# Patient Record
Sex: FEMALE | Race: OTHER | Employment: UNEMPLOYED | URBAN - METROPOLITAN AREA
[De-identification: names, ages, dates, MRNs, and addresses within clinical notes are randomized per-mention and may not be internally consistent; named-entity substitution may affect disease eponyms.]

---

## 2017-01-06 PROBLEM — R21 RASH OF FACE: Status: ACTIVE | Noted: 2017-01-06

## 2017-01-06 PROBLEM — F19.982 INSOMNIA DUE TO DRUG (HCC): Status: ACTIVE | Noted: 2017-01-06

## 2017-01-18 ENCOUNTER — OFFICE VISIT (OUTPATIENT)
Dept: SURGERY | Facility: CLINIC | Age: 37
End: 2017-01-18

## 2017-01-18 VITALS
SYSTOLIC BLOOD PRESSURE: 110 MMHG | DIASTOLIC BLOOD PRESSURE: 70 MMHG | BODY MASS INDEX: 38.45 KG/M2 | RESPIRATION RATE: 14 BRPM | HEIGHT: 67 IN | WEIGHT: 245 LBS | HEART RATE: 102 BPM

## 2017-01-18 DIAGNOSIS — H47.10 OPTIC PAPILLA EDEMA: ICD-10-CM

## 2017-01-18 DIAGNOSIS — H53.9 VISION DISTURBANCE: ICD-10-CM

## 2017-01-18 DIAGNOSIS — C50.912 INVASIVE DUCTAL CARCINOMA OF BREAST, LEFT (HCC): ICD-10-CM

## 2017-01-18 DIAGNOSIS — G93.89 BRAIN MASS: Primary | ICD-10-CM

## 2017-01-18 PROCEDURE — 99204 OFFICE O/P NEW MOD 45 MIN: CPT | Performed by: NURSE PRACTITIONER

## 2017-01-18 NOTE — H&P
Neurosurgery Clinic Visit  2017    Jenna Calzada PCP:  Jordan Nielsen MD    10/27/1980 MRN [id number]       Chief Complaint:  Patient presents with:   Other: NP referred by Dr. Flo Villalta for [facility]       HISTORY OF PRESENT ILLNESS:  [facility] 1:1:1 solution Take 5-10 mL by mouth TID AC&HS. Swish and Swallow or Swish and Spit Disp: 480 mL Rfl: 3   hydrocortisone (PROCTOSOL HC) 2.5 % Rectal Cream Apply to rectal area QID x 7 days.  (Patient taking differently: Apply to rectal area QID x 7 days. ) brisk bilaterally. EOMs intact. Visual fields are full. Face is symmetrical. Tongue is midline. Uvula and palate elevate symmetrically. Shrug shoulders normally bilaterally. Cranial nerves II-XII are grossly intact. Facial sensation intact.  Finger fluid  collections are seen.     Hemorrhage: None. Ventricular system: No hydrocephalus. Basal cisterns: Patent.   Cerebral parenchyma: Punctate chronic appearing lacunar type infarct or perivascular space is  visualized in the LEFT lentiform nucleus, kika PLAN:  1. Brain mass    2. Optic papilla edema    3. Invasive ductal carcinoma of breast, left    4. Vision disturbance      The report report is only available of her CT brain with and without scan we are unable to access the images.   We will need dedicat next week. Since she has visual symptoms and evidence of papilledema on the same side as the reported tumor she should undergo urgent tumor resection pending review of the imaging.   We will follow-up with her oncologist for staging of her cancer and futur

## 2017-01-18 NOTE — PATIENT INSTRUCTIONS
Refill policies:    • Allow 2 business days for refills; controlled substances may take longer.   • Contact your pharmacy at least 5 days prior to running out of medication and have them send an electronic request or submit request through the “request re your physician has recommended that you have a procedure or additional testing performed. [facility] will contact your insurance carrier to obtain pre-certification or prior authorization.     Unfortunately, HEIDI has seen an increas

## 2017-01-18 NOTE — PROGRESS NOTES
Pt was having problems with left eye, opthomologist recommended a CT, mass was found on CT,  Pt denies any pain at the moment.

## 2017-01-19 ENCOUNTER — TELEPHONE (OUTPATIENT)
Dept: SURGERY | Facility: CLINIC | Age: 37
End: 2017-01-19

## 2017-01-19 NOTE — TELEPHONE ENCOUNTER
Patient states she has expander in left breast for reconstructive surgery so MRI could not be done  She says that Dr. Nallely Collins was notified and wants to make sure there is nothing else to be done.   I informed her that I would discuss with APN to see if she kn

## 2017-01-20 PROBLEM — G93.9 BRAIN LESION: Status: ACTIVE | Noted: 2017-01-20

## 2017-01-23 ENCOUNTER — OFFICE VISIT (OUTPATIENT)
Dept: SURGERY | Facility: CLINIC | Age: 37
End: 2017-01-23

## 2017-01-23 ENCOUNTER — TELEPHONE (OUTPATIENT)
Dept: SURGERY | Facility: CLINIC | Age: 37
End: 2017-01-23

## 2017-01-23 VITALS
HEART RATE: 100 BPM | HEIGHT: 67 IN | RESPIRATION RATE: 14 BRPM | BODY MASS INDEX: 38.77 KG/M2 | WEIGHT: 247 LBS | SYSTOLIC BLOOD PRESSURE: 118 MMHG | DIASTOLIC BLOOD PRESSURE: 70 MMHG

## 2017-01-23 DIAGNOSIS — G93.89 BRAIN MASS: Primary | ICD-10-CM

## 2017-01-23 DIAGNOSIS — H53.412 SCOTOMA, LEFT: ICD-10-CM

## 2017-01-23 DIAGNOSIS — C50.912 INVASIVE DUCTAL CARCINOMA OF BREAST, LEFT (HCC): ICD-10-CM

## 2017-01-23 PROCEDURE — 99214 OFFICE O/P EST MOD 30 MIN: CPT | Performed by: NURSE PRACTITIONER

## 2017-01-23 RX ORDER — ERYTHROMYCIN 5 MG/G
1 OINTMENT OPHTHALMIC
Refills: 0 | Status: ON HOLD | COMMUNITY
Start: 2017-01-20 | End: 2017-02-03

## 2017-01-23 NOTE — TELEPHONE ENCOUNTER
Spoke with  who would like to get patient scheduled for surgery for a left occipital craniotomy for tumor removal for next Tuesday 1/31/17 to follow his first case. Will need to call patient to get scheduled and go over surgery instructions.

## 2017-01-23 NOTE — TELEPHONE ENCOUNTER
Patient was unable to complete MRI due to [place] [employer] tissue expander that was placed on 8/18/16 by . Will need to touch base with that office to confirm whether or not tissue expander is MRI compatible.     [facility]

## 2017-01-23 NOTE — PATIENT INSTRUCTIONS
Refill policies:    • Allow 2 business days for refills; controlled substances may take longer.   • Contact your pharmacy at least 5 days prior to running out of medication and have them send an electronic request or submit request through the “request re your physician has recommended that you have a procedure or additional testing performed. [facility] will contact your insurance carrier to obtain pre-certification or prior authorization.     Unfortunately, HEIDI has seen an increas

## 2017-01-23 NOTE — TELEPHONE ENCOUNTER
Informed Dr. Dawit Cardenas office that we have found out that Tissue expander is not MRI compatible.

## 2017-01-23 NOTE — PROGRESS NOTES
Neurosurgery Clinic Visit  2017    Jenna Chapman PCP:  Ashleigh Fall MD    10/27/1980 MRN [id number]       Chief Complaint:  Patient presents with:   Other: follow  up from 2017      HISTORY OF PRESENT ILLNESS:  Kat Curran is a(n) 45 filiberto mg total) by mouth every morning. Disp: 30 capsule Rfl: 6   Benadryl/Maalox/Lidocaine 1:1:1 solution Take 5-10 mL by mouth TID AC&HS.  Swish and Swallow or Swish and Spit Disp: 480 mL Rfl: 3   hydrocortisone (PROCTOSOL HC) 2.5 % Rectal Cream Apply to rectal 3.  Speech fluent. Comprehension intact. Pupils equally round and reactive to light. Left eyelid edema, erythema. Face is symmetrical.  Gait intact. Heel and toe walking intact.      REVIEW OF STUDIES:  FINDINGS:  Extra axial spaces: Normal in size and m Intact skull base.     =====  IMPRESSION:  1. Large smoothly contoured, centrally calcified LEFT occipital lobe extra-axial mass, best seen on  image 28, series 2, measuring up to 5.3 x 3.6 x 4 cm.  There is moderate localized mass effect on the  adjacent L

## 2017-01-25 DIAGNOSIS — G93.9 BRAIN LESION: Primary | ICD-10-CM

## 2017-01-25 NOTE — TELEPHONE ENCOUNTER
Patient scheduled for Craniotomy on 1/31/17 with [facility]    Pre-op instructions discussed with patient on phone:    · You will need to contact the Pre-admission department at [phone] to schedule your pre-op testing.     · You will need  pre operative

## 2017-01-26 RX ORDER — DIPHENHYDRAMINE HCL 25 MG
25 TABLET ORAL NIGHTLY PRN
COMMUNITY
End: 2017-03-20

## 2017-01-26 RX ORDER — ACETAMINOPHEN 500 MG
500 TABLET ORAL EVERY 6 HOURS PRN
COMMUNITY
End: 2017-03-20

## 2017-01-26 RX ORDER — SODIUM CHLORIDE, SODIUM LACTATE, POTASSIUM CHLORIDE, CALCIUM CHLORIDE 600; 310; 30; 20 MG/100ML; MG/100ML; MG/100ML; MG/100ML
INJECTION, SOLUTION INTRAVENOUS CONTINUOUS
Status: CANCELLED | OUTPATIENT
Start: 2017-01-26

## 2017-01-26 NOTE — TELEPHONE ENCOUNTER
[facility] has been authorized for 3 inpatient days. If patient stays longer than 3 days information will need to be faxed to [facility] fax# [phone]. Authorization #: [id number].      Total call time: 25:43 minutes    Order placed

## 2017-01-26 NOTE — TELEPHONE ENCOUNTER
Pre-surgical planning called to ask regarding imaging Dr. Andie Peralta would like pt to get before procedure scheduled 1/31/2017. Need to have Dr. Andie Peralta order the CT scan so that patient can call to schedule it.   Pre-surgical planning stated Dr. Andie Peralta want's kaylene

## 2017-01-26 NOTE — TELEPHONE ENCOUNTER
Patient has [facility]. PA will be needed.   CPT: [id number], [id number], 11559  ICD-10: G93.9, C50.912, H53.452

## 2017-01-27 ENCOUNTER — LAB ENCOUNTER (OUTPATIENT)
Dept: LAB | Age: 37
End: 2017-01-27
Attending: NEUROLOGICAL SURGERY
Payer: COMMERCIAL

## 2017-01-27 ENCOUNTER — TELEPHONE (OUTPATIENT)
Dept: NEUROLOGY | Facility: CLINIC | Age: 37
End: 2017-01-27

## 2017-01-27 DIAGNOSIS — R22.0 MILD TONGUE SWELLING: ICD-10-CM

## 2017-01-27 DIAGNOSIS — T45.1X5A ANEMIA ASSOCIATED WITH CHEMOTHERAPY: ICD-10-CM

## 2017-01-27 DIAGNOSIS — H53.9 VISION CHANGES: ICD-10-CM

## 2017-01-27 DIAGNOSIS — R21 RASH OF FACE: ICD-10-CM

## 2017-01-27 DIAGNOSIS — R63.5 WEIGHT GAIN DUE TO MEDICATION: ICD-10-CM

## 2017-01-27 DIAGNOSIS — D64.81 ANEMIA ASSOCIATED WITH CHEMOTHERAPY: ICD-10-CM

## 2017-01-27 DIAGNOSIS — D50.9 IRON DEFICIENCY ANEMIA, UNSPECIFIED IRON DEFICIENCY ANEMIA TYPE: ICD-10-CM

## 2017-01-27 DIAGNOSIS — K13.79 MOUTH SORES: ICD-10-CM

## 2017-01-27 DIAGNOSIS — K21.9 GASTROESOPHAGEAL REFLUX DISEASE, ESOPHAGITIS PRESENCE NOT SPECIFIED: ICD-10-CM

## 2017-01-27 DIAGNOSIS — G93.9 BRAIN LESION: ICD-10-CM

## 2017-01-27 DIAGNOSIS — F19.982 INSOMNIA DUE TO DRUG (HCC): ICD-10-CM

## 2017-01-27 DIAGNOSIS — M79.602 LEFT ARM PAIN: ICD-10-CM

## 2017-01-27 DIAGNOSIS — T50.905A WEIGHT GAIN DUE TO MEDICATION: ICD-10-CM

## 2017-01-27 DIAGNOSIS — O09.299 HISTORY OF MACROSOMIA IN INFANT IN PRIOR PREGNANCY, CURRENTLY PREGNANT: ICD-10-CM

## 2017-01-27 DIAGNOSIS — C50.912 MALIGNANT NEOPLASM OF LEFT FEMALE BREAST, UNSPECIFIED SITE OF BREAST: ICD-10-CM

## 2017-01-27 DIAGNOSIS — F41.8 ANXIETY ABOUT HEALTH: ICD-10-CM

## 2017-01-27 DIAGNOSIS — C50.912 INVASIVE DUCTAL CARCINOMA OF BREAST, LEFT (HCC): ICD-10-CM

## 2017-01-27 LAB
ANTIBODY SCREEN: NEGATIVE
APTT PPP: 38.8 SECONDS (ref 25–34)
BILIRUB UR QL STRIP.AUTO: NEGATIVE
CLARITY UR REFRACT.AUTO: CLEAR
COLOR UR AUTO: YELLOW
GLUCOSE UR STRIP.AUTO-MCNC: NEGATIVE MG/DL
INR BLD: 0.95 (ref 0.89–1.12)
KETONES UR STRIP.AUTO-MCNC: NEGATIVE MG/DL
NITRITE UR QL STRIP.AUTO: NEGATIVE
PH UR STRIP.AUTO: 5 [PH] (ref 4.5–8)
PROT UR STRIP.AUTO-MCNC: NEGATIVE MG/DL
PSA SERPL DL<=0.01 NG/ML-MCNC: 13 SECONDS (ref 12.3–14.8)
RBC UR QL AUTO: NEGATIVE
RH BLOOD TYPE: POSITIVE
SP GR UR STRIP.AUTO: 1.01 (ref 1–1.03)
UROBILINOGEN UR STRIP.AUTO-MCNC: <2 MG/DL

## 2017-01-27 PROCEDURE — 85730 THROMBOPLASTIN TIME PARTIAL: CPT

## 2017-01-27 PROCEDURE — 86850 RBC ANTIBODY SCREEN: CPT

## 2017-01-27 PROCEDURE — 81001 URINALYSIS AUTO W/SCOPE: CPT

## 2017-01-27 PROCEDURE — 36415 COLL VENOUS BLD VENIPUNCTURE: CPT

## 2017-01-27 PROCEDURE — 86900 BLOOD TYPING SEROLOGIC ABO: CPT

## 2017-01-27 PROCEDURE — 86920 COMPATIBILITY TEST SPIN: CPT

## 2017-01-27 PROCEDURE — 86901 BLOOD TYPING SEROLOGIC RH(D): CPT

## 2017-01-27 PROCEDURE — 85610 PROTHROMBIN TIME: CPT

## 2017-01-27 NOTE — TELEPHONE ENCOUNTER
LMTCB. Patient scheduled for CT scan at 10:00am and surgery to follow.    Needs to go to Outpatient registration at 9:45am

## 2017-01-27 NOTE — TELEPHONE ENCOUNTER
Spoke to pt, CT Brain ordered by Dr. Chloe Baum has been approved by your insurance, authorization # [id number] and is approved through [id number]. Please have procedure completed before 2.25.17.  Schedule at your convenience with the central scheduling depa

## 2017-01-30 ENCOUNTER — ANESTHESIA EVENT (OUTPATIENT)
Dept: SURGERY | Facility: HOSPITAL | Age: 37
End: 2017-01-30

## 2017-01-31 ENCOUNTER — SURGERY (OUTPATIENT)
Age: 37
End: 2017-01-31

## 2017-01-31 ENCOUNTER — HOSPITAL ENCOUNTER (INPATIENT)
Facility: HOSPITAL | Age: 37
LOS: 3 days | Discharge: HOME | DRG: 026 | End: 2017-02-03
Attending: NEUROLOGICAL SURGERY | Admitting: NEUROLOGICAL SURGERY
Payer: COMMERCIAL

## 2017-01-31 ENCOUNTER — HOSPITAL ENCOUNTER (OUTPATIENT)
Dept: CT IMAGING | Facility: HOSPITAL | Age: 37
Discharge: HOME | DRG: 026 | End: 2017-01-31
Attending: NEUROLOGICAL SURGERY
Payer: COMMERCIAL

## 2017-01-31 ENCOUNTER — ANESTHESIA (OUTPATIENT)
Dept: SURGERY | Facility: HOSPITAL | Age: 37
End: 2017-01-31

## 2017-01-31 DIAGNOSIS — F41.8 ANXIETY ABOUT HEALTH: ICD-10-CM

## 2017-01-31 DIAGNOSIS — C50.912 INVASIVE DUCTAL CARCINOMA OF BREAST, LEFT (HCC): ICD-10-CM

## 2017-01-31 DIAGNOSIS — T45.1X5A ANEMIA ASSOCIATED WITH CHEMOTHERAPY: ICD-10-CM

## 2017-01-31 DIAGNOSIS — T50.905A WEIGHT GAIN DUE TO MEDICATION: ICD-10-CM

## 2017-01-31 DIAGNOSIS — D49.6 BRAIN TUMOR (HCC): ICD-10-CM

## 2017-01-31 DIAGNOSIS — G93.89 BRAIN MASS: ICD-10-CM

## 2017-01-31 DIAGNOSIS — K21.9 GASTROESOPHAGEAL REFLUX DISEASE, ESOPHAGITIS PRESENCE NOT SPECIFIED: ICD-10-CM

## 2017-01-31 DIAGNOSIS — R63.5 WEIGHT GAIN DUE TO MEDICATION: ICD-10-CM

## 2017-01-31 DIAGNOSIS — O09.299 HISTORY OF MACROSOMIA IN INFANT IN PRIOR PREGNANCY, CURRENTLY PREGNANT: ICD-10-CM

## 2017-01-31 DIAGNOSIS — H53.412 SCOTOMA, LEFT: ICD-10-CM

## 2017-01-31 DIAGNOSIS — H53.9 VISION CHANGES: ICD-10-CM

## 2017-01-31 DIAGNOSIS — R22.0 MILD TONGUE SWELLING: ICD-10-CM

## 2017-01-31 DIAGNOSIS — C50.919 MALIGNANT NEOPLASM OF FEMALE BREAST (HCC): ICD-10-CM

## 2017-01-31 DIAGNOSIS — C50.912 MALIGNANT NEOPLASM OF LEFT FEMALE BREAST, UNSPECIFIED SITE OF BREAST: ICD-10-CM

## 2017-01-31 DIAGNOSIS — M79.602 LEFT ARM PAIN: ICD-10-CM

## 2017-01-31 DIAGNOSIS — F19.982 INSOMNIA DUE TO DRUG (HCC): ICD-10-CM

## 2017-01-31 DIAGNOSIS — K13.79 MOUTH SORES: ICD-10-CM

## 2017-01-31 DIAGNOSIS — R21 RASH OF FACE: ICD-10-CM

## 2017-01-31 DIAGNOSIS — D50.9 IRON DEFICIENCY ANEMIA, UNSPECIFIED IRON DEFICIENCY ANEMIA TYPE: ICD-10-CM

## 2017-01-31 DIAGNOSIS — D64.81 ANEMIA ASSOCIATED WITH CHEMOTHERAPY: ICD-10-CM

## 2017-01-31 DIAGNOSIS — G93.9 BRAIN LESION: Primary | ICD-10-CM

## 2017-01-31 LAB
ANTIBODY SCREEN: NEGATIVE
APTT PPP: 32.4 SECONDS (ref 25–34)
BLOOD TYPE BARCODE: 5100
GLUCOSE BLD-MCNC: 102 MG/DL (ref 65–99)
ISTAT BLOOD GAS BASE EXCESS: 0 MMOL/L
ISTAT BLOOD GAS HCO3: 25.1 MEQ/L (ref 22–26)
ISTAT BLOOD GAS O2 SATURATION: 100 % (ref 92–100)
ISTAT BLOOD GAS PCO2: 40 MMHG (ref 35–45)
ISTAT BLOOD GAS PH: 7.41 (ref 7.35–7.45)
ISTAT BLOOD GAS PO2: 189 MMHG (ref 80–105)
ISTAT BLOOD GAS TCO2: 26 MMOL/L (ref 22–32)
ISTAT HEMATOCRIT: 27 % (ref 37–54)
ISTAT IONIZED CALCIUM: 1.18 MMOL/L (ref 1.12–1.32)
ISTAT POTASSIUM: 3.5 MMOL/L (ref 3.6–5.1)
ISTAT SODIUM: 139 MMOL/L (ref 136–144)
POCT LOT NUMBER: NORMAL
POCT URINE PREGNANCY: NEGATIVE
RH BLOOD TYPE: POSITIVE

## 2017-01-31 PROCEDURE — 70460 CT HEAD/BRAIN W/DYE: CPT

## 2017-01-31 PROCEDURE — 00U20KZ SUPPLEMENT DURA MATER WITH NONAUTOLOGOUS TISSUE SUBSTITUTE, OPEN APPROACH: ICD-10-PCS | Performed by: NEUROLOGICAL SURGERY

## 2017-01-31 PROCEDURE — D020DZZ STEREOTACTIC OTHER PHOTON RADIOSURGERY OF BRAIN: ICD-10-PCS | Performed by: NEUROLOGICAL SURGERY

## 2017-01-31 PROCEDURE — 00B00ZX EXCISION OF BRAIN, OPEN APPROACH, DIAGNOSTIC: ICD-10-PCS | Performed by: NEUROLOGICAL SURGERY

## 2017-01-31 DEVICE — DURA 62105 SUBSTITUTE DUREPAIR 3X3IN NCE
Type: IMPLANTABLE DEVICE | Status: FUNCTIONAL
Brand: DUREPAIR®

## 2017-01-31 DEVICE — FLOSEAL SEALENT STERILE 10ML: Type: IMPLANTABLE DEVICE | Status: FUNCTIONAL

## 2017-01-31 DEVICE — DURASEAL® DURAL SEALANT SYSTEM 5ML 5 PACK
Type: IMPLANTABLE DEVICE | Site: HEAD | Status: FUNCTIONAL
Brand: DURASEAL®

## 2017-01-31 RX ORDER — SODIUM CHLORIDE, SODIUM LACTATE, POTASSIUM CHLORIDE, CALCIUM CHLORIDE 600; 310; 30; 20 MG/100ML; MG/100ML; MG/100ML; MG/100ML
INJECTION, SOLUTION INTRAVENOUS CONTINUOUS
Status: DISCONTINUED | OUTPATIENT
Start: 2017-01-31 | End: 2017-01-31

## 2017-01-31 RX ORDER — NALOXONE HYDROCHLORIDE 0.4 MG/ML
80 INJECTION, SOLUTION INTRAMUSCULAR; INTRAVENOUS; SUBCUTANEOUS AS NEEDED
Status: DISCONTINUED | OUTPATIENT
Start: 2017-01-31 | End: 2017-01-31 | Stop reason: HOSPADM

## 2017-01-31 RX ORDER — SODIUM CHLORIDE 9 MG/ML
INJECTION, SOLUTION INTRAVENOUS CONTINUOUS
Status: DISCONTINUED | OUTPATIENT
Start: 2017-01-31 | End: 2017-01-31

## 2017-01-31 RX ORDER — ACETAMINOPHEN 500 MG
500 TABLET ORAL EVERY 6 HOURS PRN
Status: DISCONTINUED | OUTPATIENT
Start: 2017-01-31 | End: 2017-02-03

## 2017-01-31 RX ORDER — BUPIVACAINE HYDROCHLORIDE AND EPINEPHRINE 2.5; 5 MG/ML; UG/ML
INJECTION, SOLUTION EPIDURAL; INFILTRATION; INTRACAUDAL; PERINEURAL AS NEEDED
Status: DISCONTINUED | OUTPATIENT
Start: 2017-01-31 | End: 2017-01-31 | Stop reason: HOSPADM

## 2017-01-31 RX ORDER — FAMOTIDINE 20 MG/1
20 TABLET ORAL 2 TIMES DAILY
Status: DISCONTINUED | OUTPATIENT
Start: 2017-01-31 | End: 2017-01-31

## 2017-01-31 RX ORDER — HYDROMORPHONE HYDROCHLORIDE 1 MG/ML
0.4 INJECTION, SOLUTION INTRAMUSCULAR; INTRAVENOUS; SUBCUTANEOUS EVERY 5 MIN PRN
Status: DISCONTINUED | OUTPATIENT
Start: 2017-01-31 | End: 2017-01-31 | Stop reason: HOSPADM

## 2017-01-31 RX ORDER — ERYTHROMYCIN 5 MG/G
1 OINTMENT OPHTHALMIC
Status: DISCONTINUED | OUTPATIENT
Start: 2017-01-31 | End: 2017-02-03

## 2017-01-31 RX ORDER — ACETAMINOPHEN 500 MG
1000 TABLET ORAL EVERY 6 HOURS PRN
Status: DISCONTINUED | OUTPATIENT
Start: 2017-01-31 | End: 2017-02-03

## 2017-01-31 RX ORDER — ONDANSETRON 2 MG/ML
4 INJECTION INTRAMUSCULAR; INTRAVENOUS AS NEEDED
Status: DISCONTINUED | OUTPATIENT
Start: 2017-01-31 | End: 2017-01-31 | Stop reason: HOSPADM

## 2017-01-31 RX ORDER — ERYTHROMYCIN 5 MG/G
1 OINTMENT OPHTHALMIC
Status: DISCONTINUED | OUTPATIENT
Start: 2017-01-31 | End: 2017-01-31

## 2017-01-31 RX ORDER — 0.9 % SODIUM CHLORIDE 0.9 %
VIAL (ML) INJECTION AS NEEDED
Status: DISCONTINUED | OUTPATIENT
Start: 2017-01-31 | End: 2017-01-31 | Stop reason: HOSPADM

## 2017-01-31 RX ORDER — HYDRALAZINE HYDROCHLORIDE 20 MG/ML
10 INJECTION INTRAMUSCULAR; INTRAVENOUS
Status: DISCONTINUED | OUTPATIENT
Start: 2017-01-31 | End: 2017-02-03

## 2017-01-31 RX ORDER — TRAMADOL HYDROCHLORIDE 50 MG/1
50 TABLET ORAL EVERY 6 HOURS PRN
Status: DISCONTINUED | OUTPATIENT
Start: 2017-01-31 | End: 2017-02-03

## 2017-01-31 RX ORDER — PANTOPRAZOLE SODIUM 20 MG/1
20 TABLET, DELAYED RELEASE ORAL EVERY MORNING
Status: DISCONTINUED | OUTPATIENT
Start: 2017-02-01 | End: 2017-02-01

## 2017-01-31 RX ORDER — SODIUM CHLORIDE 9 MG/ML
INJECTION, SOLUTION INTRAVENOUS CONTINUOUS
Status: DISCONTINUED | OUTPATIENT
Start: 2017-01-31 | End: 2017-02-03

## 2017-01-31 RX ORDER — DOCUSATE SODIUM 100 MG/1
100 CAPSULE, LIQUID FILLED ORAL 2 TIMES DAILY
Status: DISCONTINUED | OUTPATIENT
Start: 2017-01-31 | End: 2017-02-03

## 2017-01-31 RX ORDER — LABETALOL HYDROCHLORIDE 5 MG/ML
10 INJECTION, SOLUTION INTRAVENOUS AS NEEDED
Status: DISCONTINUED | OUTPATIENT
Start: 2017-01-31 | End: 2017-02-03

## 2017-01-31 RX ORDER — DEXAMETHASONE SODIUM PHOSPHATE 4 MG/ML
4 VIAL (ML) INJECTION EVERY 6 HOURS
Status: DISCONTINUED | OUTPATIENT
Start: 2017-01-31 | End: 2017-02-02

## 2017-01-31 RX ORDER — FAMOTIDINE 10 MG/ML
20 INJECTION, SOLUTION INTRAVENOUS 2 TIMES DAILY
Status: DISCONTINUED | OUTPATIENT
Start: 2017-01-31 | End: 2017-01-31

## 2017-01-31 RX ORDER — TRAMADOL HYDROCHLORIDE 50 MG/1
TABLET ORAL EVERY 6 HOURS PRN
Status: DISCONTINUED | OUTPATIENT
Start: 2017-01-31 | End: 2017-01-31 | Stop reason: SDUPTHER

## 2017-01-31 RX ORDER — HYDROMORPHONE HYDROCHLORIDE 1 MG/ML
INJECTION, SOLUTION INTRAMUSCULAR; INTRAVENOUS; SUBCUTANEOUS
Status: COMPLETED
Start: 2017-01-31 | End: 2017-01-31

## 2017-01-31 RX ORDER — PROCHLORPERAZINE MALEATE 10 MG
10 TABLET ORAL EVERY 6 HOURS PRN
Status: DISCONTINUED | OUTPATIENT
Start: 2017-01-31 | End: 2017-02-03

## 2017-01-31 RX ORDER — TRAMADOL HYDROCHLORIDE 50 MG/1
100 TABLET ORAL EVERY 6 HOURS PRN
Status: DISCONTINUED | OUTPATIENT
Start: 2017-01-31 | End: 2017-02-03

## 2017-01-31 RX ORDER — ONDANSETRON 2 MG/ML
4 INJECTION INTRAMUSCULAR; INTRAVENOUS EVERY 6 HOURS PRN
Status: DISCONTINUED | OUTPATIENT
Start: 2017-01-31 | End: 2017-02-03

## 2017-01-31 RX ORDER — LORAZEPAM 0.5 MG/1
0.5 TABLET ORAL EVERY 6 HOURS PRN
Status: DISCONTINUED | OUTPATIENT
Start: 2017-01-31 | End: 2017-02-03

## 2017-01-31 NOTE — INTERVAL H&P NOTE
Pre-op Diagnosis: Brain lesion [G93.9]    The above referenced H&P was reviewed by Mortimer Bruins. Srikanth Estimable on 1/31/2017, the patient was examined and no significant changes have occurred in the patient's condition since the H&P was performed.   I discussed wit

## 2017-01-31 NOTE — H&P (VIEW-ONLY)
Neurosurgery Clinic Visit  2017    Jenna Calzada PCP:  Jordan Nielsen MD    10/27/1980 MRN [id number]       Chief Complaint:  Patient presents with:   Other: NP referred by Dr. Flo Villalta for [facility]       HISTORY OF PRESENT ILLNESS:  Tl Adams 1:1:1 solution Take 5-10 mL by mouth TID AC&HS. Swish and Swallow or Swish and Spit Disp: 480 mL Rfl: 3   hydrocortisone (PROCTOSOL HC) 2.5 % Rectal Cream Apply to rectal area QID x 7 days.  (Patient taking differently: Apply to rectal area QID x 7 days. ) brisk bilaterally. EOMs intact. Visual fields are full. Face is symmetrical. Tongue is midline. Uvula and palate elevate symmetrically. Shrug shoulders normally bilaterally. Cranial nerves II-XII are grossly intact. Facial sensation intact.  Finger fluid  collections are seen.     Hemorrhage: None. Ventricular system: No hydrocephalus. Basal cisterns: Patent.   Cerebral parenchyma: Punctate chronic appearing lacunar type infarct or perivascular space is  visualized in the LEFT lentiform nucleus, kika PLAN:  1. Brain mass    2. Optic papilla edema    3. Invasive ductal carcinoma of breast, left    4. Vision disturbance      The report report is only available of her CT brain with and without scan we are unable to access the images.   We will need dedicat next week. Since she has visual symptoms and evidence of papilledema on the same side as the reported tumor she should undergo urgent tumor resection pending review of the imaging.   We will follow-up with her oncologist for staging of her cancer and futur

## 2017-01-31 NOTE — ANESTHESIA PREPROCEDURE EVALUATION
PRE-OP EVALUATION    Patient Name: Kat Curran    Pre-op Diagnosis: Brain lesion [G93.9]    Procedure(s):  Left occipital craniotomy for tumor resection with stereotactic neuronavigation.       Surgeon(s) and Role:     Sharon Eisenberg, DO - Primary    P % Rectal Cream Apply to rectal area QID x 7 days.  (Patient taking differently: Apply to rectal area QID x 7 days. ) Disp: 30 g Rfl: 1   ibuprofen (ADVIL) 200 MG Oral Tab Take 200 mg by mouth every 6 (six) hours as needed for Pain (taking 4 pills for swelli 01/20/2017    01/20/2017       Lab Results  Component Value Date    01/20/2017   K 4.1 01/20/2017    01/20/2017   CO2 27.7 01/20/2017   BUN 12 01/20/2017   CREATSERUM 0.83 01/20/2017   * 01/20/2017       Lab Results  Component Darshana

## 2017-02-01 ENCOUNTER — APPOINTMENT (OUTPATIENT)
Dept: CT IMAGING | Facility: HOSPITAL | Age: 37
DRG: 026 | End: 2017-02-01
Attending: NURSE PRACTITIONER
Payer: COMMERCIAL

## 2017-02-01 LAB
ALBUMIN SERPL-MCNC: 3.3 G/DL (ref 3.5–4.8)
ALP LIVER SERPL-CCNC: 71 U/L (ref 37–98)
ALT SERPL-CCNC: 38 U/L (ref 14–54)
APTT PPP: 26.1 SECONDS (ref 25–34)
AST SERPL-CCNC: 44 U/L (ref 15–41)
BILIRUB SERPL-MCNC: 0.4 MG/DL (ref 0.1–2)
BUN BLD-MCNC: 8 MG/DL (ref 8–20)
CALCIUM BLD-MCNC: 8.3 MG/DL (ref 8.3–10.3)
CHLORIDE: 110 MMOL/L (ref 101–111)
CO2: 26 MMOL/L (ref 22–32)
CREAT BLD-MCNC: 0.99 MG/DL (ref 0.55–1.02)
ERYTHROCYTE [DISTWIDTH] IN BLOOD BY AUTOMATED COUNT: 15 % (ref 11.5–16)
GLUCOSE BLD-MCNC: 152 MG/DL (ref 70–99)
HCT VFR BLD AUTO: 29.2 % (ref 34–50)
HGB BLD-MCNC: 9.6 G/DL (ref 12–16)
INR BLD: 1.07 (ref 0.89–1.12)
M PROTEIN MFR SERPL ELPH: 7 G/DL (ref 6.1–8.3)
MCH RBC QN AUTO: 28 PG (ref 27–33.2)
MCHC RBC AUTO-ENTMCNC: 32.9 G/DL (ref 31–37)
MCV RBC AUTO: 85.1 FL (ref 81–100)
OSMOLALITY SERUM: 299 MOSM/KG (ref 280–300)
PLATELET # BLD AUTO: 297 10(3)UL (ref 150–450)
POTASSIUM SERPL-SCNC: 4 MMOL/L (ref 3.6–5.1)
PSA SERPL DL<=0.01 NG/ML-MCNC: 14.2 SECONDS (ref 12.3–14.8)
RBC # BLD AUTO: 3.43 X10(6)UL (ref 3.8–5.1)
RED CELL DISTRIBUTION WIDTH-SD: 46.5 FL (ref 35.1–46.3)
SODIUM SERPL-SCNC: 146 MMOL/L (ref 136–144)
WBC # BLD AUTO: 9.9 X10(3) UL (ref 4–13)

## 2017-02-01 PROCEDURE — 70470 CT HEAD/BRAIN W/O & W/DYE: CPT

## 2017-02-01 PROCEDURE — 99291 CRITICAL CARE FIRST HOUR: CPT | Performed by: OTHER

## 2017-02-01 PROCEDURE — 99223 1ST HOSP IP/OBS HIGH 75: CPT | Performed by: INTERNAL MEDICINE

## 2017-02-01 RX ORDER — PANTOPRAZOLE SODIUM 40 MG/1
40 TABLET, DELAYED RELEASE ORAL EVERY MORNING
Status: DISCONTINUED | OUTPATIENT
Start: 2017-02-01 | End: 2017-02-03

## 2017-02-01 NOTE — PHYSICAL THERAPY NOTE
PHYSICAL THERAPY EVALUATION - INPATIENT     Room Number: 1655/3832-O  Evaluation Date: 2/1/2017  Type of Evaluation: Initial  Physician Order: PT Eval and Treat    Presenting Problem: s/p L occipital craniotomy 1/31/17  Reason for Therapy: Mobility Dys strength are within functional limits     Lower extremity ROM is within functional limits     Lower extremity strength is within functional limits     BALANCE  Static Sitting: Good  Dynamic Sitting: Fair +  Static Standing: Poor +  Dynamic Standing: Poor + with slow delmy and steady step through gait pattern. Pt given VC for sequencing and walker use. Pt with head pain limiting ambulation. Pt returned to room sitting up in bedside chair. Pt educated on POC, HEP, and recommendations.  Pt left with call light Goal #5    Goal #6    Goal Comments: Goals established on 2/1/2017

## 2017-02-01 NOTE — PROGRESS NOTES
[address] [facility] Progress Note    Jin Davis Patient Status:  Inpatient    10/27/1980 MRN [id number]   [facility] 6NE-A Attending Pawel Fall, DO   Hosp Day # 1 PCP Cheryle Hilda, MD     Subjective:  iJn Davis is a resection cavity in left parietal occipital region measuring up to 2.4 x 3.0 cm with postoperative air, fluid, and postoperative blood products.  There is also a similar appearing extra-axial collection subjacent craniotomy site measuring up to 1.0 cm

## 2017-02-01 NOTE — OCCUPATIONAL THERAPY NOTE
OCCUPATIONAL THERAPY EVALUATION - INPATIENT     Room Number: 2546/0760-O  Evaluation Date: 2/1/2017  Type of Evaluation: Initial  Presenting Problem: Crani for tumor    Physician Order: IP Consult to Occupational Therapy  Reason for Therapy: ADL/IADL Dysfu Cognitive Status:  WFL - within functional limits    VISION  Current Vision: does not wear glasses  Visual History:  NA  Ocular Range of Motion:  WFL = within functional limits  Tracking:  decreased smoothness of horizontal tracking and decreased smoothnes with RW and min assist, therapist assist pt to complete UB dressing, LB dressing. Pt was provided mastectomy information packet and lymphedema exercises, pt eductaed on resources and pt in agreement. Therapist also completed vision assessment.   Pt educate sink for 5 minutes to complete grooming routine

## 2017-02-01 NOTE — BRIEF OP NOTE
[facility] SURGERY  Brief Op Note     Jomar Ron Location: OR   [facility] [id number] MRN [id number]   Admission Date 1/31/2017 Operation Date 1/31/2017   Attending Physician Angy Naik DO Operating Physician Yahaira Enciso.  Vince Onofre DO       Pre-Operative Perez Webb

## 2017-02-01 NOTE — OPERATIVE REPORT
[facility]  Operative report    Jason Dias Location: OR   [facility] [id number] N [id number]   Admission Date 1/31/2017 Operation Date 1/31/2017   Attending Physician Ben Sandhoff,  Operating Physician Ben Sandhoff., DO     Pre-Operative Diagnosis ophthalmologist he agrees there is some findings of papilledema as well. We agreed open biopsy and resection of the tumor in an urgent fashion will be appropriate in light of her cancer history.   Her oncologist has stopped her chemotherapy last week in pr Jelly retractor was used in the inferior portion of the incision towards the neck. A [place] periosteal dissector was used to dissected the periosteal tissue.     Now that the skull was exposed the neuro navigation was brought back into the field an advance the cottonoids underneath the superior lateral and the medial portion of the tumor. Once the underside of the tumor was identified and no intervening vasculature was encountered the tumor was reflected inferiorly and medially.   Using a tumor force allowed to awaken a careful controlled fashion. Once patient was extubated she was transferred to the postanesthesia care unit and care and report were given to the responsible RN.   Postoperative examination shows that she was awakening well from anesthes

## 2017-02-01 NOTE — PLAN OF CARE
Telephone report taken from PACU @1332. Jenna arrived to the room, and care assumed from PACU @approximately 2135. Awake, alert, oriented, pleasant, and cooperative with care. Neuro checks Q1H and intact.  Bilateral  Bilateral facial and eye swelling presen

## 2017-02-01 NOTE — PLAN OF CARE
Pt assisted to chair from bed with PT; used walker, see PT's note; stated she did well. Pt sitting up for less than 10 minutes stating she needed to go back to bed and needs more pain meds. Rating pain 5-5/10; see Emar.    at bedside, cont to encou

## 2017-02-01 NOTE — PROGRESS NOTES
Pharmacy note: Duplicate Proton Pump Inhibitor with Histamine 2 blocker. Sandra Nagel is a 43year old female who has been prescribed both Famotidine and Protonix.   Pepcid was discontinued per P&T approved protocol for duplicate therapy in adult patient

## 2017-02-01 NOTE — ANESTHESIA POSTPROCEDURE EVALUATION
[address] [facility] Patient Status:  Surgery Admit   Age/Gender 43year old female MRN [id number]   Location [facility] Attending Samm Ansari DO   Hosp Day # 0 PCP Cristofer Mckeon MD       Anesthesia Post

## 2017-02-01 NOTE — PLAN OF CARE
Assumed care of the pt at 0730; bedside neuro complete and intact;see flow sheets. Pt with complaints of some pain to surgical site and nausea when taking PO. See Emar.    at bedside, updated with plan of care to remove friedman, PT to see pt and plan

## 2017-02-01 NOTE — CONSULTS
[facility]ITAL[facility]  CONSULT     Ana Maria Morgan Patient Status:  Inpatient    10/27/1980 MRN [id number]   [facility] [place] 6NE-A Attending Otilio Parr, DO   Hosp Day # 1 PCP Katelyn Upton MD     Reason for consult: Medical management LORazepam 0.5 MG Oral Tab Take 1 tab every 6 hrs if needed for anxiety/difficulty sleeping on the days you take dexamethasone. Disp: 30 tablet Rfl: 0   omeprazole 20 MG Oral Capsule Delayed Release Take 1 capsule (20 mg total) by mouth every morning.  Dis bilaterally. No wheezes. No rhonchi. Cardiovascular: S1, S2. Regular rate and rhythm. No murmurs, rubs or gallops. Equal pulses. Chest and Back: No tenderness or deformity. Abdomen: Soft, nontender, nondistended. Positive bowel sounds.  No rebound, gua

## 2017-02-01 NOTE — CONSULTS
[facility]  Neurocritical Care       Name: Jose Elias Blas  MRN: [id number]  Admission Date/Time: 1/31/2017  8:25 AM  Primary Care Provider:  Ilene Robles MD        Reason for Consultation:   Left Occipital Meningioma s/p Crani.     H macrosomia in infant in prior pregnancy, currently pregnant         Date Noted: 03/04/2011      Past Medical History   Diagnosis Date   • Cancer [facility]) 2016     breast   • Brain lesion    • Personal history of antineoplastic chemotherapy      current   • Vi pills for swelling). Disp:  Rfl:  Past Month at Unknown time   Prochlorperazine Maleate (COMPAZINE) 10 mg tablet Take 1 tablet (10 mg total) by mouth every 6 (six) hours as needed for Nausea.  Every 6 hours prn nausea or as instructed by physician Disp: 3 PRN   docusate sodium (COLACE) cap 100 mg 100 mg Oral BID   levETIRAcetam (KEPPRA) 1,000 mg in sodium chloride 0.9 % 100 mL IVPB 1,000 mg Intravenous Q12H   dexamethasone Sodium Phosphate (DECADRON) 4 MG/ML injection 4 mg 4 mg Intravenous Q6H   acetaminoph 02/01/17 0530   Gross per 24 hour   Intake   4401 ml   Output   3510 ml   Net    891 ml       HEENT- NC/AT, Neck supple. Left eye red from stye and on eye drops  Lungs - Clear  Heart - S1,S2    NEUROLOGICAL EXAMINATION:    Mental Awake, alert, oriented X3. postoperative blood products. There is also a similar appearing extra-axial collection subjacent craniotomy site measuring up to 1.0 cm in thickness which is also postoperative.  There  is a small amount of postoperative hemorrhage along the left tentorial Unspecified papilledema. Abnormal bone scan. History of LEFT breast neoplasm. COMPARISON: Nuclear medicine bone scan, 9/2/2016. IMAGING TECHNIQUE: Noncontrast axial images of the brain were performed.   Dose reduction CT scan done according to ALARA (As Low visualized. Visualized Orbits: Negative. Sella and skull base: Intact skull base. 1/11/2017  IMPRESSION: 1.  Large smoothly contoured, centrally calcified LEFT occipital lobe extra-axial mass, best seen on image 28, series 2, measuring up to 5.3 x 3.6 x intraoperative navigation. Ventricles and sulci are within normal limits. The basal cisterns are patent. The gray-white matter differentiation is intact. There is no obvious acute intracranial hemorrhage or extra-axial fluid collection.  No evidence of Anxiety      Plan:  Neuro:  - Neuro checks Q 1hr  - Pain Meds -  Tyleonol, Fentanyl and Tramadol.  - Sedative/Hypnotic Meds - none.  - PT/OT and Speech Therapy when appropriate  - Decadron 4 mg IV Q 6hrs. - Seizure prophylaxis - Keppra 500 mg PO/IV BID. Medicine.

## 2017-02-01 NOTE — PLAN OF CARE
CARDIOVASCULAR - ADULT    • Maintains optimal cardiac output and hemodynamic stability Progressing    • Absence of cardiac arrhythmias or at baseline Progressing        NEUROLOGICAL - ADULT    • Achieves stable or improved neurological status Progressing

## 2017-02-01 NOTE — PAYOR COMM NOTE
Attending Physician: Ryan Riggs DO    Review Type: ADMISSION   Reviewer: Soham Silva       Date: February 1, 2017 - 8:42 AM  Payor: ADRIANA HINTON  Authorization Number: [id number]  Admit date: 1/31/2017  8:25 AM       REVIEWER COMMENTS    Pre-Operat 1000 mg Intravenous Sterling Rosenthal, SABINE      ondansetron HCl [facility]) injection 4 mg Q^H PRN    Date Action Dose Route User    2/1/2017 0520 Given 4 mg Intravenous Sterling Rosenthal RN    1/31/2017 2250 Given 4 mg Intravenous Sterling Rosenthal, SABINE      Sodium lorazepam  GERD/stomatitis - continue PPI and maalox    2/1/17 -   Assessment:  6. S/P Left occipital craniotomy for tumor resection POD #1  1. Final path pending  7. Breast CA  8. Expected post-op pain  9. N&V      Plan:  1. Neuro checks Q2 hours  2.  Keep

## 2017-02-01 NOTE — PROGRESS NOTES
[facility]  Neurocritical Care APN Progress Note    NAME: Anne Chen - ROOM: 73/9313- - MRN: [id number] - Age: 43year old - :10/27/1980    History Of Present Illness:  Anne Chen is a 43year old female with PMHx significant f Neurologic: This patient is alert and orientated x 3. Speech fluent. Pupils equally round and reactive to light. 3+ brisk bilaterally. EOMs intact. Visual fields are full. Tongue is midline. Face is symmetrical. Uvula and palate elevate symmetrically. Cerebral parenchyma: Punctate chronic appearing lacunar type infarct or perivascular space is  visualized in the LEFT lentiform nucleus, measuring 5 mm, image 19, series 2.  There is subtle  vasogenic edema seen in the LEFT occipital lobe with mass effect f -CT w+w/o contrast AM  -neuro checks Q1hr  -keppra 1000mg BID  -decadron 4mg Q6hr  -neurosurgery management appreciated       F/E/N:   IVF, diet as tolerated.        ABCDEF Bundle    A- prn pain medication  B- not intubated  C- no sedation  D- CAM-ICU negat

## 2017-02-02 LAB
BASOPHILS # BLD AUTO: 0.01 X10(3) UL (ref 0–0.1)
BASOPHILS NFR BLD AUTO: 0.1 %
BUN BLD-MCNC: 9 MG/DL (ref 8–20)
CALCIUM BLD-MCNC: 8.3 MG/DL (ref 8.3–10.3)
CHLORIDE: 108 MMOL/L (ref 101–111)
CO2: 26 MMOL/L (ref 22–32)
CREAT BLD-MCNC: 0.67 MG/DL (ref 0.55–1.02)
EOSINOPHIL # BLD AUTO: 0 X10(3) UL (ref 0–0.3)
EOSINOPHIL NFR BLD AUTO: 0 %
ERYTHROCYTE [DISTWIDTH] IN BLOOD BY AUTOMATED COUNT: 14.7 % (ref 11.5–16)
GLUCOSE BLD-MCNC: 136 MG/DL (ref 70–99)
HAV IGM SER QL: 2.2 MG/DL (ref 1.7–3)
HCT VFR BLD AUTO: 28.1 % (ref 34–50)
HGB BLD-MCNC: 8.9 G/DL (ref 12–16)
IMMATURE GRANULOCYTE COUNT: 0.13 X10(3) UL (ref 0–1)
IMMATURE GRANULOCYTE RATIO %: 1.2 %
LYMPHOCYTES # BLD AUTO: 0.76 X10(3) UL (ref 0.9–4)
LYMPHOCYTES NFR BLD AUTO: 6.9 %
MCH RBC QN AUTO: 28 PG (ref 27–33.2)
MCHC RBC AUTO-ENTMCNC: 31.7 G/DL (ref 31–37)
MCV RBC AUTO: 88.4 FL (ref 81–100)
MONOCYTES # BLD AUTO: 0.71 X10(3) UL (ref 0.1–0.6)
MONOCYTES NFR BLD AUTO: 6.4 %
NEUTROPHIL ABS PRELIM: 9.43 X10 (3) UL (ref 1.3–6.7)
NEUTROPHILS # BLD AUTO: 9.43 X10(3) UL (ref 1.3–6.7)
NEUTROPHILS NFR BLD AUTO: 85.4 %
PHOSPHATE SERPL-MCNC: 3.3 MG/DL (ref 2.5–4.9)
PLATELET # BLD AUTO: 270 10(3)UL (ref 150–450)
POTASSIUM SERPL-SCNC: 4.1 MMOL/L (ref 3.6–5.1)
RBC # BLD AUTO: 3.18 X10(6)UL (ref 3.8–5.1)
RED CELL DISTRIBUTION WIDTH-SD: 47.9 FL (ref 35.1–46.3)
SODIUM SERPL-SCNC: 140 MMOL/L (ref 136–144)
WBC # BLD AUTO: 11 X10(3) UL (ref 4–13)

## 2017-02-02 PROCEDURE — 99233 SBSQ HOSP IP/OBS HIGH 50: CPT | Performed by: OTHER

## 2017-02-02 RX ORDER — LEVETIRACETAM 500 MG/1
500 TABLET ORAL 2 TIMES DAILY
Status: DISCONTINUED | OUTPATIENT
Start: 2017-02-02 | End: 2017-02-03

## 2017-02-02 RX ORDER — DEXAMETHASONE 4 MG/1
TABLET ORAL
Qty: 9 TABLET | Refills: 0 | Status: SHIPPED | OUTPATIENT
Start: 2017-02-02 | End: 2017-02-15 | Stop reason: ALTCHOICE

## 2017-02-02 RX ORDER — TRAMADOL HYDROCHLORIDE 50 MG/1
TABLET ORAL EVERY 6 HOURS PRN
Qty: 60 TABLET | Refills: 0 | Status: SHIPPED | OUTPATIENT
Start: 2017-02-02 | End: 2017-03-20

## 2017-02-02 RX ORDER — BISACODYL 10 MG
10 SUPPOSITORY, RECTAL RECTAL ONCE
Status: COMPLETED | OUTPATIENT
Start: 2017-02-02 | End: 2017-02-02

## 2017-02-02 RX ORDER — LEVETIRACETAM 500 MG/1
500 TABLET ORAL 2 TIMES DAILY
Qty: 36 TABLET | Refills: 0 | Status: SHIPPED | OUTPATIENT
Start: 2017-02-02 | End: 2017-03-20

## 2017-02-02 RX ORDER — DEXAMETHASONE 4 MG/1
4 TABLET ORAL EVERY 8 HOURS SCHEDULED
Status: DISCONTINUED | OUTPATIENT
Start: 2017-02-02 | End: 2017-02-03

## 2017-02-02 NOTE — PROGRESS NOTES
[facility]  Progress Note    Tl Rogelalicia Patient Status:  Inpatient    10/27/1980 MRN [id number]   [facility] 6NE-A Attending Mina Curran, DO   Hosp Day # 2 PCP Jordan Nielsen MD     Subjective:    She is sitting in chair  H chemotherapy  Now off treatment  Labs ok   No further treatment planned for her during this admission    2. Brain mass - likely meningioma   S/p resection   Path is pending     3.  Anemia   Mild anemia  She had anemia of chemotherapy and now some blood loss

## 2017-02-02 NOTE — OCCUPATIONAL THERAPY NOTE
OCCUPATIONAL THERAPY TREATMENT NOTE - INPATIENT     Room Number: 8267/4908-C  Session: 1   Number of Visits to Meet Established Goals: 7    Presenting Problem: Crani for tumor    History related to current admission: Pt is s/p crani for brain tumor, pt wit grooming such as brushing teeth?: None  -   Eating meals?: None    AM-PAC Score:  Score: 21  Approx Degree of Impairment: 32.79%  Standardized Score (AM-PAC Scale): 44.27  CMS Modifier (G-Code): CJ    FUNCTIONAL TRANSFER ASSESSMENT  Supine to Sit : Minimum supervision    UE Exercise Program Goal  Patient will be independent with bilateral AAROM HEP (home exercise program).     Additional Goals:  Pt will verbalize at least 3 energy conservation techniques  Pt will stand at sink for 5 minutes to complete groomi

## 2017-02-02 NOTE — PROGRESS NOTES
[id number] [facility] Progress Note    Yanelis Kathleen Patient Status:  Inpatient    10/27/1980 MRN [id number]   [facility] 6NE-A Attending Lanre Ba, DO   Hosp Day # 2 PCP Marco Antonio Lloyd MD     Subjective:  Yanelis Kathleen extra-axial collection subjacent craniotomy site measuring up to 1.0 cm in thickness which is also postoperative. There is a small amount of postoperative hemorrhage along the left tentorial leaf measuring up to 2.2 x 0.8 cm.  A few other scattered areas of brain that hopefully will be MRI after her tissue expander is remove from left breast during reconstruction. Keppra 500mg PO BID for at least 2 weeks. RORO Caldera  [employer] [phone]  Pager [phone]

## 2017-02-02 NOTE — PLAN OF CARE
Received pt from CNICU alert/oriented. C/o, \"I need to have a bowel movement or Dr. Brooke Anton will not let me go home. \" Denies pain. Staples to fidel temples and posterior head approximated/intact. Keep SBP <140. NS running at 80 cc/hr. Up with SBA.

## 2017-02-02 NOTE — PROGRESS NOTES
[facility]  Neurocritical Care       Subjective: Daily Pacheco is a(n) 43year old female who was admitted after a left occipital craniotomy for resection of left occipital meningioma on 1/31/17 with neuronavigation.  Says still has pain and symmetrical.   #12:Tongue is midline. Power of tongue normal.        Motor: Tone and strength normal and symmetric. 5/5 b/l 2+ b/l. Sensory: Normal and symmetric to light touch. Cerebellar: Finger-nose-finger intact. Gait: Deferred.   Diagnostics: evident fracture. Trace ethmoid and maxillary mucosal thickening. Remainder of the visualized paranasal sinuses and mastoid air cells are unremarkable.        2/1/2017  CONCLUSION:  There are interval changes related to recent left parietal occipital cranio corresponds to the abnormality seen on recent bone scan. The mass extends to the superior sagittal sinus without gross evidence of invasion. The mass extends inferiorly to the level of the tentorium.  There is mild hyperostosis and scalloping of the underly the relative absence of brain edema. Recommend further evaluation with contrast-enhanced MRI of the brain. 2. NO acute intracranial hemorrhage. NO midline shift. NO abnormal extra axial fluid collections. NO hydrocephalus. Patent basal cisterns.  3.Punctate paranasal sinuses and mastoid air cells are unremarkable. 1/31/2017  CONCLUSION:   1. No acute intracranial abnormality identified. 2. 5.3 cm meningioma along the left parietal-occipital region. Dictated by:  Caleb Mckeon MD on 1/31/2017 at 10:3 sodium (COLACE) cap 100 mg 100 mg Oral BID   dexamethasone Sodium Phosphate (DECADRON) 4 MG/ML injection 4 mg 4 mg Intravenous Q6H   acetaminophen (TYLENOL EXTRA STRENGTH) tab 500 mg 500 mg Oral Q6H PRN   Or      acetaminophen (TYLENOL EXTRA STRENGTH) tab    · Monitor H/H with goal hemoglobin more than 7gm/dl, h/h 8.9/28.1 will keep a close watch. Endocrine:  · Hyperglycemia protocol   Skin:  · Monitor for skin breakdown.   DVT Prophylaxis:  · SCD’s  · no Heparin 5000U SQ BID    ABCDEF:  A: Pain score 0/10,

## 2017-02-02 NOTE — PHYSICAL THERAPY NOTE
PHYSICAL THERAPY TREATMENT NOTE - INPATIENT    Room Number: 6395/3481-K     Session: 1  Number of Visits to Meet Established Goals: 3    Presenting Problem: s/p L occipital craniotomy 1/31/17    Problem List  Principal Problem:    Brain tumor ([facility] [address]  Activ need...   -   Moving to and from a bed to a chair (including a wheelchair)?: None   -   Need to walk in hospital room?: A Little   -   Climbing 3-5 steps with a railing?: A Little    AM-PAC Score:  Raw Score: 21   PT Approx Degree of Impairment Score: 28. 9 Discharge Recommendations: Home     PLAN  PT Treatment Plan: Bed mobility; Endurance; Energy conservation;Patient education;Gait training;Strengthening;Stair training;Transfer training;Balance training      CURRENT GOALS      Goal #1  Patient is able to demo

## 2017-02-02 NOTE — PROGRESS NOTES
Patient has been seeing [facility] PCP Dr Chuck Currie. [facility] and have transferred care to her service.      Ofe Kennedy MD  [facility]

## 2017-02-02 NOTE — DISCHARGE SUMMARY
[facility]  Discharge Summary    Earl Lopez Patient Status:  Inpatient    10/27/1980 MRN [id number]   [facility] 6NE-A Attending Jeferson Shaikh, DO   Hosp Day # 2 PCP Kieran Holbrook MD     Date of Admission: 2017    Date with swallowing .  Reports rhinorrhea since being on chemo. Swallowing improves.  Has not seen ENT.  Her father is with her today who is a retired physician      Procedures: Procedure(s):  Left occipital craniotomy for tumor resection with stereotactic neur 0    LORazepam 0.5 MG Oral Tab  Take 1 tab every 6 hrs if needed for anxiety/difficulty sleeping on the days you take dexamethasone. Qty: 30 tablet Refills: 0  Associated Diagnoses:Invasive ductal carcinoma of breast, left ([facility] [address]  Anxiety about health    o today  No driving until seen by Dr. Andie Perlata in the clinic or while taking narcotics  No NSAID's, Aspirin or Fish Oil products for 7 days after surgery  Notify Surgeon immediately for signs of infection: temp >101.5 or any drainage/swelling/redness at incisio

## 2017-02-02 NOTE — CONSULTS
[facility]  Report of Consultation    Lio Aguirre Patient Status:  Inpatient    10/27/1980 MRN [id number]   [facility] 6NE-A Attending Pauleen Lundborg DO   Hosp Day # 1 PCP Yusra Orozco MD     REASON FOR CONSULTATION:     Sarahy mcg, Intravenous, Q1H PRN  •  LORazepam (ATIVAN) tab 0.5 mg, 0.5 mg, Oral, Q6H PRN  •  Prochlorperazine Maleate (COMPAZINE) tab 10 mg, 10 mg, Oral, Q6H PRN  •  hydrocortisone (ANUSOL-HC) 2.5 % rectal cream, , Rectal, BID  •  0.9%  NaCl infusion, , Intraven supple. Chest: Clear to auscultation. Heart: Regular rate and rhythm. Abdomen: Soft, non tender with good bowel sounds. Extremities: Pedal pulses are present. No edema. Neurological: Grossly intact.             DIAGNOSTIC WORK UP:     Laboratory you for allowing me to participate in the care of your patient.     Brittney De La Cruz MD

## 2017-02-02 NOTE — CONSULTS
General Medicine Consult      Reason for consult: medical management    Consulted by: neurosurgery    PCP: Felipe Jaimes MD      History of Present Illness: Patient is a 43year old female with breast cancer on chemo, brain tumor s/p L occipital cr Tab Take 1 tab every 6 hrs if needed for anxiety/difficulty sleeping on the days you take dexamethasone. Disp: 30 tablet Rfl: 0   omeprazole 20 MG Oral Capsule Delayed Release Take 1 capsule (20 mg total) by mouth every morning.  Disp: 30 capsule Rfl: 6   B negative except for what's stated above. Including negative for chest pain, shortness of breath, syncope.        OBJECTIVE:  /74 mmHg  Pulse 65  Temp(Src) 97.8 °F (36.6 °C) (Oral)  Resp 14  Ht 170.2 cm (5' 7\")  Wt 236 lb 1.8 oz (107.1 kg)  BMI 36.97 neurosurgery, bp control, keppra, steroids, IVF per neurosurg    **breast cancer on chemo  -onc on consult, appreciate  -no acute issues while inpatient    **PPx-scds, no ppx anticoagulation given recent surgery        Outpatient records or previous hospit

## 2017-02-02 NOTE — PLAN OF CARE
CARDIOVASCULAR - ADULT    • Maintains optimal cardiac output and hemodynamic stability Progressing        NEUROLOGICAL - ADULT    • Achieves stable or improved neurological status Progressing    • Achieves maximal functionality and self care Progressing

## 2017-02-03 VITALS
HEART RATE: 85 BPM | DIASTOLIC BLOOD PRESSURE: 79 MMHG | WEIGHT: 236.13 LBS | SYSTOLIC BLOOD PRESSURE: 142 MMHG | OXYGEN SATURATION: 97 % | TEMPERATURE: 98 F | BODY MASS INDEX: 37.06 KG/M2 | HEIGHT: 67 IN | RESPIRATION RATE: 18 BRPM

## 2017-02-03 NOTE — PLAN OF CARE
Assumed care at 1900  Recd pt on room air. Assisted pt in the bathroom, steady gait. Incision on the head with staples, c/d/i, hao  Neuro intact, still spot on the left eye. Per pt,it is improving  Dulcolax supp given. No BM yet, encouraged pt to walk.   C

## 2017-02-03 NOTE — OCCUPATIONAL THERAPY NOTE
OCCUPATIONAL THERAPY TREATMENT NOTE - INPATIENT     Room Number: 6384/4800-P  Session: 2   Number of Visits to Meet Established Goals: 7    Presenting Problem: Crani for tumor    History related to current admission: Pt is s/p crani for brain tumor, pt wit meals?: None    AM-PAC Score:  Score: 24  Approx Degree of Impairment: 0%  Standardized Score (AM-PAC Scale): 57.54  CMS Modifier (G-Code): CH    FUNCTIONAL TRANSFER ASSESSMENT  Supine to Sit : Independent  Sit to Stand: Independent    Skilled Therapy Prov grooming routine

## 2017-02-03 NOTE — PROGRESS NOTES
[facility] Hospitalist Progress Note                                                                   [id number] [facility]  10/27/1980    SUBJECTIVE:    Patient states she feels better and feels ready to bisacodyl, fentaNYL citrate **OR** [DISCONTINUED] fentaNYL citrate, LORazepam, Prochlorperazine Maleate, Labetalol HCl, hydrALAzine HCl, ondansetron HCl, acetaminophen **OR** acetaminophen, TraMADol HCl **OR** TraMADol HCl    Assessment/Plan:  Principal Pr

## 2017-02-04 LAB — BLOOD TYPE BARCODE: 5100

## 2017-02-06 ENCOUNTER — HOSPITAL ENCOUNTER (OUTPATIENT)
Dept: OCCUPATIONAL MEDICINE | Facility: HOSPITAL | Age: 37
Setting detail: THERAPIES SERIES
Discharge: HOME | End: 2017-02-06
Attending: FAMILY MEDICINE
Payer: COMMERCIAL

## 2017-02-06 DIAGNOSIS — C50.919 MALIGNANT NEOPLASM OF FEMALE BREAST (HCC): ICD-10-CM

## 2017-02-06 DIAGNOSIS — D49.6 BRAIN TUMOR (HCC): Primary | ICD-10-CM

## 2017-02-06 PROCEDURE — 97535 SELF CARE MNGMENT TRAINING: CPT

## 2017-02-06 PROCEDURE — 97165 OT EVAL LOW COMPLEX 30 MIN: CPT

## 2017-02-06 NOTE — PROGRESS NOTES
OCCUPATIONAL THERAPY UE LYMPHEDEMA EVALUATION:   Referring Physician: Dr. Tucker Richards  Diagnosis: Left upper quadrant lymphedema (I97.2) s/p mastectomy d/t  Malignant neoplasm of female breast [facility]) (C50.919 Date of Service: 2/6/2017     PATIENT [facility] and impacts Jenna's clothing fit. She also reports feeling self-conscious about her limb size. Lymphedema also increases her infection risk and risk for further volume increase and negative tissue changes.    Jenna would benefit from skilled Occupational Th Total Treatment Time: 60 min     PLAN OF CARE:    Goals:    1. Pt will tolerate Left UE light compression via Tensogrip sleeve for 8-10 hours. 3 sessions  2. Pt will be independent in decongestive exercises. 4 sessions   3.  Pt will be independent in self 39.5   10cm above elbow crease 39.0 38.0   5cm above elbow crease 35.9 35.3   Elbow crease 29.0 28.7          20cm above ulnar styloid   30.6   30.8   15cm above ulnar styloid 30.5 30.0   10cm above ulnar styloid 27.8 27.4   5cm above ulnar styloid 25.4 24

## 2017-02-08 ENCOUNTER — TELEPHONE (OUTPATIENT)
Dept: SURGERY | Facility: CLINIC | Age: 37
End: 2017-02-08

## 2017-02-09 ENCOUNTER — HOSPITAL ENCOUNTER (OUTPATIENT)
Dept: OCCUPATIONAL MEDICINE | Facility: HOSPITAL | Age: 37
Setting detail: THERAPIES SERIES
Discharge: HOME | End: 2017-02-09
Attending: FAMILY MEDICINE
Payer: COMMERCIAL

## 2017-02-09 PROCEDURE — 97140 MANUAL THERAPY 1/> REGIONS: CPT

## 2017-02-09 NOTE — TELEPHONE ENCOUNTER
Patient called back stating she is still having double vision (was having this prior to surgery as well.) Since surgery she has had a little bit of light headedness on and off. She is also having numbness in her feet more now since surgery.  She states she

## 2017-02-09 NOTE — TELEPHONE ENCOUNTER
LMTCB. Patient had a Left occipital craniotomy for tumor resection with stereotactic neuronavigation on 1/31/17 with . Post-op appointment currently scheduled 2/15/17 woth  in neuro-onc clinic.

## 2017-02-09 NOTE — PROGRESS NOTES
Dx: Left upper quadrant lymphedema (I97.2) s/p mastectomy d/t Malignant neoplasm of female breast [facility]) (C50.919  Authorized # of Visits:  2/16        Next MD visit/plan renewal:  3/6/17  Fall Risk: Standard          Precautions:  Lymphedema; seizure; C-se self-management of lymphedema. 16 sessions       Plan:   Continue current plan. Begin instruction in self-manual lymph drainage. Review decongestive exercises as needed.      Charges: 4 Manual Therapy    Total Timed Treatment: 55 min  Total Treatment Time:

## 2017-02-14 ENCOUNTER — HOSPITAL ENCOUNTER (OUTPATIENT)
Dept: OCCUPATIONAL MEDICINE | Facility: HOSPITAL | Age: 37
Setting detail: THERAPIES SERIES
Discharge: HOME | End: 2017-02-14
Attending: FAMILY MEDICINE
Payer: COMMERCIAL

## 2017-02-14 PROCEDURE — 97140 MANUAL THERAPY 1/> REGIONS: CPT

## 2017-02-15 ENCOUNTER — OFFICE VISIT (OUTPATIENT)
Dept: SURGERY | Facility: CLINIC | Age: 37
End: 2017-02-15

## 2017-02-15 VITALS
HEIGHT: 67 IN | SYSTOLIC BLOOD PRESSURE: 118 MMHG | WEIGHT: 245 LBS | HEART RATE: 84 BPM | BODY MASS INDEX: 38.45 KG/M2 | DIASTOLIC BLOOD PRESSURE: 78 MMHG

## 2017-02-15 DIAGNOSIS — Z98.890 S/P CRANIOTOMY: Primary | ICD-10-CM

## 2017-02-15 DIAGNOSIS — C50.912 INVASIVE DUCTAL CARCINOMA OF BREAST, LEFT (HCC): ICD-10-CM

## 2017-02-15 DIAGNOSIS — H53.412 SCOTOMA, LEFT: ICD-10-CM

## 2017-02-15 DIAGNOSIS — H53.9 VISION DISTURBANCE: ICD-10-CM

## 2017-02-15 PROCEDURE — 99024 POSTOP FOLLOW-UP VISIT: CPT | Performed by: NURSE PRACTITIONER

## 2017-02-15 NOTE — PROGRESS NOTES
Patient doing well, no problems with incision. Denies any headaches, N/V.  Waiting to hear about chemotherapy, would like to discuss with Dr. Hattie Chi

## 2017-02-15 NOTE — PROGRESS NOTES
Neurosurgery Clinic Visit  Post-op Crani  2/15/2017    CAROLE Bowen PCP:  Larissa Davis MD    10/27/1980 MRN [id number]           REASON FOR VISIT: post op f/u    HISTORY OF PRESENT ILLNESS:Jenna Montalvo Crystal Rogers is a 43year oldfemale 15 Left occipita mg total) by mouth every morning. Disp: 30 capsule Rfl: 6   Benadryl/Maalox/Lidocaine 1:1:1 solution Take 5-10 mL by mouth TID AC&HS.  Swish and Swallow or Swish and Spit Disp: 480 mL Rfl: 3   hydrocortisone (PROCTOSOL HC) 2.5 % Rectal Cream Apply to rectal breakdown, visual changes  3. Imaging: CT brain w/ and w/o in 3 months. If tissue expander is removed recommend MRI brain w/ and w/o  4. F/u appointment with Dr Shaun Patricia.   Dr. Delicia Wood will d/w Dr NEWTON regarding resuming chemotherapy regimen; anticipate appear to be intact. She is clear to drive from a neurosurgical standpoint. Follow-up imaging should be done in 3 months and if the patient still has the tissue expander she should have a CT with contrast in 3 months.   If the patient has her tissue expan

## 2017-02-15 NOTE — PROGRESS NOTES
Dx: Left upper quadrant lymphedema (I97.2) s/p mastectomy d/t Malignant neoplasm of female breast [facility]) (C50.919  Authorized # of Visits:  3/16        Next MD visit/plan renewal:  3/6/17  Fall Risk: Standard          Precautions:  Lymphedema; seizure; C-se Review decongestive exercises as needed.      Charges: 4 Manual Therapy    Total Timed Treatment: 55 min  Total Treatment Time: 60 min

## 2017-02-15 NOTE — PATIENT INSTRUCTIONS
Refill policies:    • Allow 2 business days for refills; controlled substances may take longer.   • Contact your pharmacy at least 5 days prior to running out of medication and have them send an electronic request or submit request through the “request re your physician has recommended that you have a procedure or additional testing performed. [facility] will contact your insurance carrier to obtain pre-certification or prior authorization.     Unfortunately, HEIDI has seen an increas

## 2017-02-16 ENCOUNTER — HOSPITAL ENCOUNTER (OUTPATIENT)
Dept: OCCUPATIONAL MEDICINE | Facility: HOSPITAL | Age: 37
Setting detail: THERAPIES SERIES
Discharge: HOME | End: 2017-02-16
Attending: FAMILY MEDICINE
Payer: COMMERCIAL

## 2017-02-16 ENCOUNTER — TELEPHONE (OUTPATIENT)
Dept: SURGERY | Facility: CLINIC | Age: 37
End: 2017-02-16

## 2017-02-16 PROCEDURE — 97140 MANUAL THERAPY 1/> REGIONS: CPT

## 2017-02-16 NOTE — PROGRESS NOTES
Dx: Left upper quadrant lymphedema (I97.2) s/p mastectomy d/t Malignant neoplasm of female breast [facility]) (C50.919  Authorized # of Visits:  4/16        Next MD visit/plan renewal:  3/6/17  Fall Risk: Standard          Precautions:  Lymphedema; seizure; C-se self-manual lymph drainage, decongestive exercises and lymphedema precautions for life-long self-management of lymphedema. 16 sessions       Plan:   Continue current plan. Continue instruction in self-manual lymph drainage; instruct in limb sequence.   Revi

## 2017-02-16 NOTE — TELEPHONE ENCOUNTER
Spoke with patient who stated there was a staple left in to the right side of her head. Patient informed she may come in at her leisure and one of the nurses here can remove that for her. Patient understood and was thankful.

## 2017-02-21 ENCOUNTER — HOSPITAL ENCOUNTER (OUTPATIENT)
Dept: OCCUPATIONAL MEDICINE | Facility: HOSPITAL | Age: 37
Setting detail: THERAPIES SERIES
Discharge: HOME | End: 2017-02-21
Attending: FAMILY MEDICINE
Payer: COMMERCIAL

## 2017-02-21 PROCEDURE — 97140 MANUAL THERAPY 1/> REGIONS: CPT

## 2017-02-21 NOTE — PROGRESS NOTES
Dx: Left upper quadrant lymphedema (I97.2) s/p mastectomy d/t Malignant neoplasm of female breast [facility]) (C50.919  Authorized # of Visits:  5/16        Next MD visit/plan renewal:  3/6/17  Fall Risk: Standard          Precautions:  Lymphedema; seizure; C-se compression in home environment, h/e needs increase in compression at this point to maximize her volume loss to allow her to be measured for a compression sleeve. Making good progress towards goals. Goals:   1.  Pt will tolerate Left UE light compressio

## 2017-02-24 ENCOUNTER — NURSE ONLY (OUTPATIENT)
Dept: SURGERY | Facility: CLINIC | Age: 37
End: 2017-02-24

## 2017-02-24 ENCOUNTER — HOSPITAL ENCOUNTER (OUTPATIENT)
Dept: OCCUPATIONAL MEDICINE | Facility: HOSPITAL | Age: 37
Setting detail: THERAPIES SERIES
Discharge: HOME | End: 2017-02-24
Attending: FAMILY MEDICINE
Payer: COMMERCIAL

## 2017-02-24 PROCEDURE — 97140 MANUAL THERAPY 1/> REGIONS: CPT

## 2017-02-24 NOTE — PROGRESS NOTES
Dx: Left upper quadrant lymphedema (I97.2) s/p mastectomy d/t Malignant neoplasm of female breast [facility]) (C50.919  Authorized # of Visits:  6/16        Next MD visit/plan renewal:  3/6/17  Fall Risk: Standard          Precautions:  Lymphedema; seizure; C-se decongestive exercises. 4 sessions  ACHIEVED   3. Pt will be independent in self-manual lymph drainage. 8 sessions ACHIEVED     4.  Reduce Left UE lymphedema volume by 8-10cm and improve tissue quality to supple to reduce pt's discomfort, allow her to wear

## 2017-02-28 ENCOUNTER — HOSPITAL ENCOUNTER (OUTPATIENT)
Dept: OCCUPATIONAL MEDICINE | Facility: HOSPITAL | Age: 37
Setting detail: THERAPIES SERIES
Discharge: HOME | End: 2017-02-28
Attending: FAMILY MEDICINE
Payer: COMMERCIAL

## 2017-02-28 PROCEDURE — 97140 MANUAL THERAPY 1/> REGIONS: CPT

## 2017-02-28 NOTE — PROGRESS NOTES
Dx: Left upper quadrant lymphedema (I97.2) s/p mastectomy d/t Malignant neoplasm of female breast [facility]) (C50.919  Authorized # of Visits:  7/16        Next MD visit/plan renewal:  3/6/17  Fall Risk: Standard          Precautions:  Lymphedema; seizure; C-se further review of self-manual lymph drainage techniques. Goals:   1. Pt will tolerate Left UE light compression via Tensogrip sleeve for 8-10 hours. 3 sessions ACHIEVED   2. Pt will be independent in decongestive exercises. 4 sessions  ACHIEVED   3.  Pt

## 2017-03-01 ENCOUNTER — TELEPHONE (OUTPATIENT)
Dept: SURGERY | Facility: CLINIC | Age: 37
End: 2017-03-01

## 2017-03-02 ENCOUNTER — HOSPITAL ENCOUNTER (OUTPATIENT)
Dept: OCCUPATIONAL MEDICINE | Facility: HOSPITAL | Age: 37
Setting detail: THERAPIES SERIES
Discharge: HOME | End: 2017-03-02
Attending: FAMILY MEDICINE
Payer: COMMERCIAL

## 2017-03-02 PROCEDURE — 97140 MANUAL THERAPY 1/> REGIONS: CPT

## 2017-03-03 PROCEDURE — 83540 ASSAY OF IRON: CPT | Performed by: INTERNAL MEDICINE

## 2017-03-03 PROCEDURE — 83550 IRON BINDING TEST: CPT | Performed by: INTERNAL MEDICINE

## 2017-03-03 PROCEDURE — 82728 ASSAY OF FERRITIN: CPT | Performed by: INTERNAL MEDICINE

## 2017-03-06 ENCOUNTER — HOSPITAL ENCOUNTER (OUTPATIENT)
Dept: OCCUPATIONAL MEDICINE | Facility: HOSPITAL | Age: 37
Setting detail: THERAPIES SERIES
Discharge: HOME | End: 2017-03-06
Attending: FAMILY MEDICINE
Payer: COMMERCIAL

## 2017-03-06 PROCEDURE — 97140 MANUAL THERAPY 1/> REGIONS: CPT

## 2017-03-06 NOTE — PROGRESS NOTES
Progress Summary    Pt has attended 9/16, cancelled 0, and no shown 0 visits in Occupational Therapy. Subjective:   Pt reports daily use of Tensogrip sleeve for modified compression of Left UE.  Had a second occurrence of Left hand swelling last evenin volume loss to allow her to be measured for an OTS compression sleeve, which occurred on 3/7. Anticipates delivery of sleeve this week.      EDEMA/TISSUE QUALITY:  Left UE circumferential lymphedema volume decreased by 3.0cm since initial eval. Left UE now have any questions, please contact me at Dept: [phone]. Sincerely,  Electronically signed by therapist: Maria Isabel Conte OTR/FÁTIMA, IRINA         TREATMENT:  *Re-assessment of status   *Step-by-step review of self-manual lymph drainage with amendmen

## 2017-03-08 ENCOUNTER — HOSPITAL ENCOUNTER (OUTPATIENT)
Dept: OCCUPATIONAL MEDICINE | Facility: HOSPITAL | Age: 37
Setting detail: THERAPIES SERIES
Discharge: HOME | End: 2017-03-08
Attending: INTERNAL MEDICINE
Payer: COMMERCIAL

## 2017-03-08 PROCEDURE — 97140 MANUAL THERAPY 1/> REGIONS: CPT

## 2017-03-08 NOTE — PROGRESS NOTES
Dx: Left upper quadrant lymphedema (I97.2) s/p mastectomy d/t Malignant neoplasm of female breast [facility]) (C50.919  Authorized # of Visits:  10/16        Next MD visit/plan renewal:  4/6/17  Fall Risk: Standard          Precautions:  Lymphedema; seizure; C-s truncal lymphedema volume to slight to reduce discomfort when limb is resting at her side and to reduce her infection risk. 16 sessions ACHIEVED   6.  Pt will be independent in use of compression garments, self-manual lymph drainage, decongestive exercises

## 2017-03-09 ENCOUNTER — APPOINTMENT (OUTPATIENT)
Dept: OCCUPATIONAL MEDICINE | Facility: HOSPITAL | Age: 37
End: 2017-03-09
Attending: FAMILY MEDICINE
Payer: COMMERCIAL

## 2017-03-14 ENCOUNTER — HOSPITAL ENCOUNTER (OUTPATIENT)
Dept: OCCUPATIONAL MEDICINE | Facility: HOSPITAL | Age: 37
Setting detail: THERAPIES SERIES
Discharge: HOME | End: 2017-03-14
Attending: FAMILY MEDICINE
Payer: COMMERCIAL

## 2017-03-14 PROBLEM — D49.6 BRAIN TUMOR (HCC): Status: RESOLVED | Noted: 2017-01-20 | Resolved: 2017-03-14

## 2017-03-14 PROBLEM — I89.0 LYMPHEDEMA OF ARM: Status: ACTIVE | Noted: 2017-03-14

## 2017-03-14 PROCEDURE — 97140 MANUAL THERAPY 1/> REGIONS: CPT

## 2017-03-14 NOTE — PROGRESS NOTES
Dx: Left upper quadrant lymphedema (I97.2) s/p mastectomy d/t Malignant neoplasm of female breast [facility]) (C50.919  Authorized # of Visits:  11/16        Next MD visit/plan renewal:  4/6/17  Fall Risk: Standard          Precautions:  Lymphedema; seizure; C-s resting at her side and to reduce her infection risk. 16 sessions ACHIEVED   6.  Pt will be independent in use of compression garments, self-manual lymph drainage, decongestive exercises and lymphedema precautions for life-long self-management of lymphedema

## 2017-03-16 ENCOUNTER — HOSPITAL ENCOUNTER (OUTPATIENT)
Dept: OCCUPATIONAL MEDICINE | Facility: HOSPITAL | Age: 37
Setting detail: THERAPIES SERIES
Discharge: HOME | End: 2017-03-16
Attending: FAMILY MEDICINE
Payer: COMMERCIAL

## 2017-03-16 PROCEDURE — 97140 MANUAL THERAPY 1/> REGIONS: CPT

## 2017-03-16 PROCEDURE — 97760 ORTHOTIC MGMT&TRAING 1ST ENC: CPT

## 2017-03-16 NOTE — PROGRESS NOTES
Dx: Left upper quadrant lymphedema (I97.2) s/p mastectomy d/t Malignant neoplasm of female breast [facility]) (C50.919  Authorized # of Visits:  12/16        Next MD visit/plan renewal:  4/6/17  Fall Risk: Standard          Precautions:  Lymphedema; seizure; C-s tolerated, and to monitor for any discomfort, pressure areas. Recommended that pt re-meet with garment specialist next week to assess response to daily wearing.  Advised pt that she may need to try a different /product if she continues to have d

## 2017-03-20 ENCOUNTER — OFFICE VISIT (OUTPATIENT)
Dept: SURGERY | Facility: CLINIC | Age: 37
End: 2017-03-20

## 2017-03-20 VITALS — RESPIRATION RATE: 14 BRPM | HEART RATE: 90 BPM | DIASTOLIC BLOOD PRESSURE: 80 MMHG | SYSTOLIC BLOOD PRESSURE: 120 MMHG

## 2017-03-20 DIAGNOSIS — G93.2 IDIOPATHIC INTRACRANIAL HYPERTENSION: ICD-10-CM

## 2017-03-20 DIAGNOSIS — H53.412 SCOTOMA, LEFT: ICD-10-CM

## 2017-03-20 DIAGNOSIS — G93.89 BRAIN MASS: ICD-10-CM

## 2017-03-20 DIAGNOSIS — Z98.890 S/P CRANIOTOMY: Primary | ICD-10-CM

## 2017-03-20 PROCEDURE — 99024 POSTOP FOLLOW-UP VISIT: CPT | Performed by: NURSE PRACTITIONER

## 2017-03-20 NOTE — PROGRESS NOTES
Neurosurgery Clinic Visit  Post-op Crani  2/15/2017    SHERRELL Harevy PCP:  Yusra Orozco MD    10/27/1980 MRN [id number]           REASON FOR VISIT: post op f/u    HISTORY OF PRESENT ILLNESS:Jenna Lagunas Miguelina Jolly is a 43year oldfemale 15 Left occipita EXAMINATION:  GENERAL:  Patient is in no acute distress. HEENT:  Normocephalic, atraumatic  NEUROLOGICAL:  This patient is alert and orientated x 3. Speech fluent. Comprehension intact. Pupils equally round and reactive to light. 3+ brisk bilaterally. visit.   At any time if the patient has the tissue expander removed as is planned we will ask for an MRI of the brain with and without IV contrast.  I do want the patient to see the neuro-ophthalmologist in May which should be about the time of her 3 month

## 2017-03-20 NOTE — PATIENT INSTRUCTIONS
Refill policies:    • Allow 2 business days for refills; controlled substances may take longer.   • Contact your pharmacy at least 5 days prior to running out of medication and have them send an electronic request or submit request through the “request re your physician has recommended that you have a procedure or additional testing performed. [facility]) will contact your insurance carrier to obtain pre-certification or prior authorization.     Unfortunately, HEIDI has seen an increas

## 2017-03-21 ENCOUNTER — HOSPITAL ENCOUNTER (OUTPATIENT)
Dept: OCCUPATIONAL MEDICINE | Facility: HOSPITAL | Age: 37
Setting detail: THERAPIES SERIES
End: 2017-03-21
Attending: FAMILY MEDICINE
Payer: COMMERCIAL

## 2017-03-23 ENCOUNTER — HOSPITAL ENCOUNTER (OUTPATIENT)
Dept: OCCUPATIONAL MEDICINE | Facility: HOSPITAL | Age: 37
Setting detail: THERAPIES SERIES
Discharge: HOME | End: 2017-03-23
Attending: FAMILY MEDICINE
Payer: COMMERCIAL

## 2017-03-23 PROCEDURE — 97140 MANUAL THERAPY 1/> REGIONS: CPT

## 2017-03-24 NOTE — PROGRESS NOTES
Dx: Left upper quadrant lymphedema (I97.2) s/p mastectomy d/t Malignant neoplasm of female breast [facility]) (C50.919  Authorized # of Visits:  13/16        Next MD visit/plan renewal:  4/6/17  Fall Risk: Standard          Precautions:  Lymphedema; seizure; C-s decongestive exercises and lymphedema precautions for life-long self-management of lymphedema. 16 sessions       Plan:   Continue current plan.       Charges: 3 Manual Therapy    Total Timed Treatment: 45 min  Total Treatment Time: 50 min

## 2017-03-28 ENCOUNTER — APPOINTMENT (OUTPATIENT)
Dept: OCCUPATIONAL MEDICINE | Facility: HOSPITAL | Age: 37
End: 2017-03-28
Attending: FAMILY MEDICINE
Payer: COMMERCIAL

## 2017-03-28 ENCOUNTER — TELEPHONE (OUTPATIENT)
Dept: OCCUPATIONAL MEDICINE | Facility: HOSPITAL | Age: 37
End: 2017-03-28

## 2017-03-30 ENCOUNTER — APPOINTMENT (OUTPATIENT)
Dept: OCCUPATIONAL MEDICINE | Facility: HOSPITAL | Age: 37
End: 2017-03-30
Attending: FAMILY MEDICINE
Payer: COMMERCIAL

## 2017-03-30 ENCOUNTER — TELEPHONE (OUTPATIENT)
Dept: OCCUPATIONAL MEDICINE | Facility: HOSPITAL | Age: 37
End: 2017-03-30

## 2017-04-06 ENCOUNTER — HOSPITAL ENCOUNTER (OUTPATIENT)
Dept: OCCUPATIONAL MEDICINE | Facility: HOSPITAL | Age: 37
Setting detail: THERAPIES SERIES
Discharge: HOME | End: 2017-04-06
Attending: FAMILY MEDICINE
Payer: COMMERCIAL

## 2017-04-06 PROCEDURE — 97140 MANUAL THERAPY 1/> REGIONS: CPT

## 2017-04-06 PROCEDURE — 97760 ORTHOTIC MGMT&TRAING 1ST ENC: CPT

## 2017-04-07 NOTE — PROGRESS NOTES
Progress Summary    Pt has attended 14/16, cancelled 2, and no shown 0 visits in Occupational Therapy. Subjective:   Pt reports she received her second set of compression garments, h/e has not yet tried them on.  Admits that she has not worn her Left U dominant Right vs 1.1cm smaller as seen at last progress summary. Lymphedema density over medial surface of upper arm and forearm is boggy, non-pitting. Hand is supple. Slight, slightly boggy lymphedema over Left lateral trunk. Goals:   1.  Pt will diogenes tolerated.    COMPRESSION:  Left UE:   Juzo Soft 20-30mmHg compression sleeve, size VI Max; seamless compression glove    Charges: 3 Manual Therapy, 1 Orthotic Fit and Training    Total Timed Treatment: 55 min  Total Treatment Time: 60 min

## 2017-04-13 ENCOUNTER — APPOINTMENT (OUTPATIENT)
Dept: OCCUPATIONAL MEDICINE | Facility: HOSPITAL | Age: 37
End: 2017-04-13
Attending: FAMILY MEDICINE
Payer: COMMERCIAL

## 2017-04-18 ENCOUNTER — TELEPHONE (OUTPATIENT)
Dept: SURGERY | Facility: CLINIC | Age: 37
End: 2017-04-18

## 2017-04-18 ENCOUNTER — HOSPITAL ENCOUNTER (OUTPATIENT)
Dept: CT IMAGING | Facility: HOSPITAL | Age: 37
Discharge: HOME | End: 2017-04-18
Attending: NURSE PRACTITIONER
Payer: COMMERCIAL

## 2017-04-18 DIAGNOSIS — Z98.890 S/P CRANIOTOMY: ICD-10-CM

## 2017-04-18 PROCEDURE — 70470 CT HEAD/BRAIN W/O & W/DYE: CPT

## 2017-04-19 ENCOUNTER — APPOINTMENT (OUTPATIENT)
Dept: OCCUPATIONAL MEDICINE | Facility: HOSPITAL | Age: 37
End: 2017-04-19
Attending: FAMILY MEDICINE
Payer: COMMERCIAL

## 2017-04-19 ENCOUNTER — TELEPHONE (OUTPATIENT)
Dept: OCCUPATIONAL MEDICINE | Facility: HOSPITAL | Age: 37
End: 2017-04-19

## 2017-04-19 NOTE — TELEPHONE ENCOUNTER
Message received that pt called and cancelled today's session; no reason provided. Re-scheduled for 4/20.

## 2017-04-20 ENCOUNTER — HOSPITAL ENCOUNTER (OUTPATIENT)
Dept: OCCUPATIONAL MEDICINE | Facility: HOSPITAL | Age: 37
Setting detail: THERAPIES SERIES
Discharge: HOME | End: 2017-04-20
Attending: FAMILY MEDICINE
Payer: COMMERCIAL

## 2017-04-20 PROCEDURE — 97140 MANUAL THERAPY 1/> REGIONS: CPT

## 2017-04-21 NOTE — PROGRESS NOTES
Dx: Left upper quadrant lymphedema (I97.2) s/p mastectomy d/t Malignant neoplasm of female breast [facility]) (C50.919  Authorized # of Visits:  15/16        Next MD visit/plan renewal:  5/6/17  Fall Risk: Standard          Precautions:  Lymphedema; seizure; C-s sequence while waiting for her children in the school  line). *Left upper quadrant manual lymph drainage. *Re-educated pt on basic and advanced compression devices as tools to assist with home self-management.  Provided pt with written information re-assessment of status and assessment of success in self-management techniques.      Charges: 3 Manual Therapy    Total Timed Treatment: 45 min  Total Treatment Time: 50 min

## 2017-04-24 NOTE — TELEPHONE ENCOUNTER
Informed patient that by report, there are some subtle changes but for the most part it is stable. Will have APN review imaging and get back to her with feedback.

## 2017-05-01 ENCOUNTER — OFFICE VISIT (OUTPATIENT)
Dept: SURGERY | Facility: CLINIC | Age: 37
End: 2017-05-01

## 2017-05-01 VITALS — DIASTOLIC BLOOD PRESSURE: 68 MMHG | RESPIRATION RATE: 16 BRPM | HEART RATE: 80 BPM | SYSTOLIC BLOOD PRESSURE: 100 MMHG

## 2017-05-01 DIAGNOSIS — Z86.018 HISTORY OF MENINGIOMA: Primary | ICD-10-CM

## 2017-05-01 DIAGNOSIS — Z98.890 POST-OPERATIVE STATE: ICD-10-CM

## 2017-05-01 PROCEDURE — 99024 POSTOP FOLLOW-UP VISIT: CPT | Performed by: NEUROLOGICAL SURGERY

## 2017-05-01 NOTE — PATIENT INSTRUCTIONS
Refill policies:    • Allow 2 business days for refills; controlled substances may take longer.   • Contact your pharmacy at least 5 days prior to running out of medication and have them send an electronic request or submit request through the “request re insurance carrier to obtain pre-certification or prior authorization. Unfortunately, HEIDI has seen an increase in denial of payment even though the procedure/test has been pre-certified.   You are strongly encouraged to contact your insurance carrier to v

## 2017-05-09 ENCOUNTER — HOSPITAL (OUTPATIENT)
Dept: OTHER | Age: 37
End: 2017-05-09
Attending: SURGERY

## 2017-05-10 ENCOUNTER — APPOINTMENT (OUTPATIENT)
Dept: OCCUPATIONAL MEDICINE | Facility: HOSPITAL | Age: 37
End: 2017-05-10
Attending: FAMILY MEDICINE
Payer: COMMERCIAL

## 2017-05-24 ENCOUNTER — TELEPHONE (OUTPATIENT)
Dept: OCCUPATIONAL MEDICINE | Facility: HOSPITAL | Age: 37
End: 2017-05-24

## 2017-06-03 PROCEDURE — 86376 MICROSOMAL ANTIBODY EACH: CPT | Performed by: PHYSICIAN ASSISTANT

## 2017-06-03 PROCEDURE — 84480 ASSAY TRIIODOTHYRONINE (T3): CPT | Performed by: PHYSICIAN ASSISTANT

## 2017-06-22 PROBLEM — E04.1 THYROID NODULE: Status: ACTIVE | Noted: 2017-06-22

## 2017-06-26 NOTE — PROGRESS NOTES
[facility]   Outpatient Neurological Surgery Follow Up    Darshanajaime Phillip  : 10/27/1980  2019  PCP: Larissa Davis MD  Referring Provider: No ref. provider found    REASON FOR VISIT:Patient presents with:   Follow - Up: post is resolution of the pneumocephalus deep to the left prior occipital craniotomy site and resolution of the acute blood products.  There is a extra-axial fluid collection at this level measuring 2.2 x 1.2 x 1.9 cm either representing residual   blood product

## 2017-07-16 ENCOUNTER — CHARTING TRANS (OUTPATIENT)
Dept: OTHER | Age: 37
End: 2017-07-16

## 2017-09-18 ENCOUNTER — TELEPHONE (OUTPATIENT)
Dept: SURGERY | Facility: CLINIC | Age: 37
End: 2017-09-18

## 2017-09-18 NOTE — TELEPHONE ENCOUNTER
Spoke to Wanda at [facility], she stated for [id number] no PA is needed.  Call ref#Suad [id number] [id number] call time 2:39    Spoke to patient

## 2017-09-20 ENCOUNTER — TELEPHONE (OUTPATIENT)
Dept: SURGERY | Facility: CLINIC | Age: 37
End: 2017-09-20

## 2017-10-04 NOTE — TELEPHONE ENCOUNTER
Patient states her MRI was cancelled by radiology because it was not authorized. I informed patient that I would alert our referrals team and have them call her when she gets authorization so that she may reschedule.  I informed her that the order for the M Calm

## 2017-10-06 ENCOUNTER — HOSPITAL ENCOUNTER (OUTPATIENT)
Dept: MRI IMAGING | Facility: HOSPITAL | Age: 37
Discharge: HOME | End: 2017-10-06
Attending: NEUROLOGICAL SURGERY
Payer: COMMERCIAL

## 2017-10-06 DIAGNOSIS — Z86.018 HISTORY OF MENINGIOMA: ICD-10-CM

## 2017-10-06 PROCEDURE — A9575 INJ GADOTERATE MEGLUMI 0.1ML: HCPCS | Performed by: NEUROLOGICAL SURGERY

## 2017-10-06 PROCEDURE — 70553 MRI BRAIN STEM W/O & W/DYE: CPT | Performed by: NEUROLOGICAL SURGERY

## 2017-10-16 ENCOUNTER — OFFICE VISIT (OUTPATIENT)
Dept: SURGERY | Facility: CLINIC | Age: 37
End: 2017-10-16

## 2017-10-16 VITALS
SYSTOLIC BLOOD PRESSURE: 104 MMHG | BODY MASS INDEX: 33.12 KG/M2 | DIASTOLIC BLOOD PRESSURE: 64 MMHG | HEIGHT: 67 IN | HEART RATE: 63 BPM | RESPIRATION RATE: 16 BRPM | WEIGHT: 211 LBS

## 2017-10-16 DIAGNOSIS — Z86.018 HISTORY OF MENINGIOMA: Primary | ICD-10-CM

## 2017-10-16 PROCEDURE — 99214 OFFICE O/P EST MOD 30 MIN: CPT | Performed by: NEUROLOGICAL SURGERY

## 2017-10-16 NOTE — PATIENT INSTRUCTIONS
Refill policies:    • Allow 2-3 business days for refills; controlled substances may take longer.   • Contact your pharmacy at least 5 days prior to running out of medication and have them send an electronic request or submit request through the [facility] have a procedure or additional testing performed. [facility]) will contact your insurance carrier to obtain pre-certification or prior authorization.     Unfortunately, HEIDI has seen an increase in denial of payment even though the p

## 2017-11-01 ENCOUNTER — TELEPHONE (OUTPATIENT)
Dept: SURGERY | Facility: CLINIC | Age: 37
End: 2017-11-01

## 2017-11-13 NOTE — PROGRESS NOTES
[facility]   [facility] Follow Up    Deyanira Rich  : 10/27/1980  2017  PCP: Cristofer Mckeon MD  Referring Provider: No ref.  provider found    REASON FOR VISIT:Patient presents with:  Test Results identified. There is moderate dural enhancement and thickening. This may be postoperative in nature. Possibility of minimal residual meningioma   cannot be entirely excluded.            Dictated by: Ana Cordova MD on 10/06/2017 at 11:46       Approve

## 2017-12-08 PROBLEM — Z79.810 CARE RELATED TO CURRENT TAMOXIFEN USE: Status: ACTIVE | Noted: 2017-12-08

## 2017-12-08 PROCEDURE — 87624 HPV HI-RISK TYP POOLED RSLT: CPT | Performed by: OBSTETRICS & GYNECOLOGY

## 2017-12-08 PROCEDURE — 88175 CYTOPATH C/V AUTO FLUID REDO: CPT | Performed by: OBSTETRICS & GYNECOLOGY

## 2018-06-18 ENCOUNTER — APPOINTMENT (OUTPATIENT)
Dept: INTERNAL MEDICINE | Age: 38
End: 2018-06-18

## 2018-06-22 PROBLEM — L56.4 POLYMORPHIC LIGHT ERUPTION: Status: ACTIVE | Noted: 2018-06-22

## 2018-11-09 ENCOUNTER — OFFICE VISIT (OUTPATIENT)
Dept: SURGERY | Facility: CLINIC | Age: 38
End: 2018-11-09
Payer: COMMERCIAL

## 2018-11-09 ENCOUNTER — TELEPHONE (OUTPATIENT)
Dept: SURGERY | Facility: CLINIC | Age: 38
End: 2018-11-09

## 2018-11-09 VITALS — HEART RATE: 70 BPM | SYSTOLIC BLOOD PRESSURE: 124 MMHG | DIASTOLIC BLOOD PRESSURE: 80 MMHG

## 2018-11-09 DIAGNOSIS — D32.9 MENINGIOMA (HCC): Primary | ICD-10-CM

## 2018-11-09 DIAGNOSIS — Z98.890 S/P CRANIOTOMY: ICD-10-CM

## 2018-11-09 PROCEDURE — 99213 OFFICE O/P EST LOW 20 MIN: CPT | Performed by: PHYSICIAN ASSISTANT

## 2018-11-09 NOTE — TELEPHONE ENCOUNTER
Pt completed KAITLIN in office requesting records from Dr. Ailyn Arguello to be sent to [facility]. KAITLIN faxed to Dr. Alonzo Horta @ [phone], confirmation received. Signed KAITLIN sent to scanning.

## 2018-11-09 NOTE — PROGRESS NOTES
Pt is here for follow up meningioma. 10/16/17 LOV. Pt states that she has blurriness in the left eye.

## 2018-11-09 NOTE — PROGRESS NOTES
Neurosurgery Clinic Visit  2018    Jenna Montejo PCP:  Kayce Beaulieu MD    10/27/1980 MRN [id number]       CC:  S/P L Occipital Craniotomy for meningioma resection 17 Dr. Loree Denton    HPI:    Vaibhav Cuellar is here for f/u of meningioma WHO grade 1. Extraocular muscles are intact. Pupils are equal round reactive to light. Extraocular muscles are intact.   HEART: Regular rate and rhythm  LUNGS: Nonlabored  NEUROLOGICAL: Patient is moving all 4 extremities spontaneously with full resistance in all musc

## 2018-11-21 ENCOUNTER — HOSPITAL ENCOUNTER (OUTPATIENT)
Dept: MRI IMAGING | Facility: HOSPITAL | Age: 38
Discharge: HOME | End: 2018-11-21
Attending: PHYSICIAN ASSISTANT
Payer: COMMERCIAL

## 2018-11-21 DIAGNOSIS — D32.9 MENINGIOMA (HCC): ICD-10-CM

## 2018-11-21 DIAGNOSIS — Z98.890 S/P CRANIOTOMY: ICD-10-CM

## 2018-11-21 PROCEDURE — A9575 INJ GADOTERATE MEGLUMI 0.1ML: HCPCS | Performed by: PHYSICIAN ASSISTANT

## 2018-11-21 PROCEDURE — 70553 MRI BRAIN STEM W/O & W/DYE: CPT | Performed by: PHYSICIAN ASSISTANT

## 2018-11-21 NOTE — TELEPHONE ENCOUNTER
Patient of Dr.Shantan Mcneal-opthamology was referred to [facility]-.  Received copy of note from [facility] endorsed to Sofi Earlyma.

## 2018-11-26 ENCOUNTER — TELEPHONE (OUTPATIENT)
Dept: NEUROLOGY | Facility: CLINIC | Age: 38
End: 2018-11-26

## 2018-11-26 DIAGNOSIS — H53.9 VISUAL DISTURBANCE: Primary | ICD-10-CM

## 2018-11-26 DIAGNOSIS — Z98.890 S/P CRANIOTOMY: ICD-10-CM

## 2018-11-27 NOTE — TELEPHONE ENCOUNTER
Patient informed of the message below and understood. Call tx to [address] to arrange f/u appointment with .

## 2018-11-27 NOTE — TELEPHONE ENCOUNTER
MRI looks stable. I did talk with the neurologist Dr. Liv Stephen about her visual episodes. She would like to see her in clinic to discuss work up for seizure activity vs complex migraines.   She could try to treat his with medications if the patient is i

## 2018-12-05 ENCOUNTER — OFFICE VISIT (OUTPATIENT)
Dept: SURGERY | Facility: CLINIC | Age: 38
End: 2018-12-05
Payer: COMMERCIAL

## 2018-12-05 VITALS — HEART RATE: 72 BPM | DIASTOLIC BLOOD PRESSURE: 76 MMHG | SYSTOLIC BLOOD PRESSURE: 120 MMHG

## 2018-12-05 DIAGNOSIS — Z86.018 S/P RESECTION OF MENINGIOMA: Primary | ICD-10-CM

## 2018-12-05 DIAGNOSIS — Z98.890 S/P RESECTION OF MENINGIOMA: Primary | ICD-10-CM

## 2018-12-05 PROCEDURE — 99213 OFFICE O/P EST LOW 20 MIN: CPT | Performed by: NEUROLOGICAL SURGERY

## 2018-12-05 NOTE — PATIENT INSTRUCTIONS
Refill policies:    • Allow 2-3 business days for refills; controlled substances may take longer.   • Contact your pharmacy at least 5 days prior to running out of medication and have them send an electronic request or submit request through the “request re entire amount billed. Precertification and Prior Authorizations: If your physician has recommended that you have a procedure or additional testing performed.   [facility]) will contact your insurance carrier to obtain pre-certi

## 2018-12-05 NOTE — PROGRESS NOTES
Pt is here for follow up to review imaging. MRI of the brain. Pt states that she is still the same since the last office visit.

## 2018-12-05 NOTE — PROGRESS NOTES
[facility]OLAS AZEVEDO [facility]  Neurosurgery Clinic Visit      HISTORY OF PRESENT ILLNESS:  Raudel Rodriguez is a(n) 34year old female s/p 1/31/17 left occipital craniotomy for resection of a meningioma with Dr. Olivia Wetzel. She has stable vision in the left eye.  I never used smokeless tobacco. She reports that she does not drink alcohol or use drugs. ALLERGIES:  No Known Allergies    MEDICATIONS:    (Not in a hospital admission)    No current facility-administered medications for this visit.      REVIEW OF SYSTEMS

## 2018-12-06 PROCEDURE — 86431 RHEUMATOID FACTOR QUANT: CPT | Performed by: INTERNAL MEDICINE

## 2018-12-12 ENCOUNTER — OFFICE VISIT (OUTPATIENT)
Dept: NEUROLOGY | Facility: CLINIC | Age: 38
End: 2018-12-12
Payer: COMMERCIAL

## 2018-12-12 VITALS
RESPIRATION RATE: 14 BRPM | SYSTOLIC BLOOD PRESSURE: 110 MMHG | DIASTOLIC BLOOD PRESSURE: 60 MMHG | HEART RATE: 64 BPM | BODY MASS INDEX: 38 KG/M2 | WEIGHT: 241 LBS

## 2018-12-12 DIAGNOSIS — H53.9 VISUAL AURA: ICD-10-CM

## 2018-12-12 DIAGNOSIS — H53.9 VISUAL DISTURBANCE: Primary | ICD-10-CM

## 2018-12-12 PROCEDURE — 99244 OFF/OP CNSLTJ NEW/EST MOD 40: CPT | Performed by: OTHER

## 2018-12-12 RX ORDER — TOPIRAMATE 50 MG/1
50 TABLET, FILM COATED ORAL NIGHTLY
Qty: 30 TABLET | Refills: 0 | Status: SHIPPED | OUTPATIENT
Start: 2018-12-12 | End: 2019-01-11

## 2018-12-12 NOTE — PROGRESS NOTES
HPI:    Patient ID: Jose Elias Blas is a 34year old female. Referring provider: Dr Gerson Nielsen  PCP: Dr Rand Ramos    Dear Sherry Redman,    HPI  I had the pleasure of seeing your patient Doc Andrea for evaluation of episodic right eye visual disturance.  She has history RIGHT  2017   • INSERTION PORT-A-CATHETER N/A 2016    Performed by Tai Pate MD at [address] LEFT  2016   • [address] Left 2017    Performed by Ryan Riggs DO at [facility] MAIN OR   •  Rfl:      Allergies:No Known Allergies   PHYSICAL EXAM:   Physical Exam  Blood pressure 110/60, pulse 64, resp. rate 14, weight 241 lb. Vitals reviewed  General: well developed, well nourished  HEENT: Normocephalic and atraumatic.  Without scleral icteru left occipital lobe  We will get an EEG. Start low dose Topamax at night and depending upon EEG results and clinical progress will adjust the dose.  Advised caution with driving        I discussed with the patient the possible diagnoses, treatment and progn

## 2018-12-12 NOTE — PATIENT INSTRUCTIONS
Refill policies:    • Allow 2-3 business days for refills; controlled substances may take longer.   • Contact your pharmacy at least 5 days prior to running out of medication and have them send an electronic request or submit request through the “request re entire amount billed. Precertification and Prior Authorizations: If your physician has recommended that you have a procedure or additional testing performed.   [facility]) will contact your insurance carrier to obtain pre-certi cleaned out of your hair with a warm washcloth before you leave. If you like, you can bring a hat to wear after the test or put your hair in a ponytail.    · You will be asked to relax with your eyes closed for a majority of the test and take a nap, if pos

## 2018-12-21 PROCEDURE — 95816 EEG AWAKE AND DROWSY: CPT | Performed by: OTHER

## 2018-12-26 ENCOUNTER — PROCEDURE VISIT (OUTPATIENT)
Dept: NEUROLOGY | Facility: CLINIC | Age: 38
End: 2018-12-26

## 2018-12-26 DIAGNOSIS — H53.9 VISUAL AURA: Primary | ICD-10-CM

## 2018-12-26 NOTE — PROCEDURES
ELECTROENCEPHALOGRAM REPORT      Patient Name: Xuan Fernandez  Chart ID: [id number]  Ordering Physician: Dai Amezcua MD Date of Test: 12/26/2018  Referring Physician:   Patient Diagnosis: Visual aura    HISTORY  PT IS A 39 YO FEMALE WHO PRESENTS FOR EV

## 2019-01-11 ENCOUNTER — OFFICE VISIT (OUTPATIENT)
Dept: NEUROLOGY | Facility: CLINIC | Age: 39
End: 2019-01-11
Payer: COMMERCIAL

## 2019-01-11 VITALS
DIASTOLIC BLOOD PRESSURE: 70 MMHG | WEIGHT: 238 LBS | RESPIRATION RATE: 16 BRPM | HEART RATE: 80 BPM | BODY MASS INDEX: 37 KG/M2 | SYSTOLIC BLOOD PRESSURE: 108 MMHG

## 2019-01-11 DIAGNOSIS — H53.9 VISUAL AURA: Primary | ICD-10-CM

## 2019-01-11 DIAGNOSIS — Z86.018 S/P RESECTION OF MENINGIOMA: ICD-10-CM

## 2019-01-11 DIAGNOSIS — Z98.890 S/P RESECTION OF MENINGIOMA: ICD-10-CM

## 2019-01-11 PROCEDURE — 99213 OFFICE O/P EST LOW 20 MIN: CPT | Performed by: OTHER

## 2019-01-11 RX ORDER — MELATONIN 10 MG
CAPSULE ORAL NIGHTLY
COMMUNITY

## 2019-01-11 RX ORDER — TOPIRAMATE 100 MG/1
100 TABLET, FILM COATED ORAL NIGHTLY
Qty: 30 TABLET | Refills: 0 | Status: SHIPPED | OUTPATIENT
Start: 2019-01-11 | End: 2019-02-26

## 2019-01-11 NOTE — PROGRESS NOTES
HPI:    Patient ID: Kat Curran is a 34year old female. Visual Disturbance   Pertinent negatives include no headaches. Jenna reports visual symptoms remains the same or slightly better.  No associated eye fluttering, headaches or confusion/drowsine 2016    Performed by Melissa Raymond MD at [facility] MAIN OR   • [address] Left 2016    Performed by Virgen Teresa MD at [facility] MAIN OR   •   10/2011   • CRANIOTOMY FOR TUMOR Left 2017    Performed by Armas Spray, to AA of Arms BID x 2 weeks, PRN flares Disp: 1 Tube Rfl: 1   Hydrocortisone Valerate (WESTCORT) 0.2 % External Cream Apply to AA of face BID x 1 week, PRN flares Disp: 1 Tube Rfl: 1   Multiple Vitamins Oral Tab Take 1 tablet by mouth daily.  Disp:  Rfl: the defined types were placed in this encounter. Meds This Visit:  Requested Prescriptions     Signed Prescriptions Disp Refills   • topiramate 100 MG Oral Tab 30 tablet 0     Sig: Take 1 tablet (100 mg total) by mouth nightly.        Imaging & Referra

## 2019-02-26 RX ORDER — TOPIRAMATE 50 MG/1
TABLET, FILM COATED ORAL
Qty: 30 TABLET | Refills: 0 | Status: SHIPPED | OUTPATIENT
Start: 2019-02-26 | End: 2019-05-17

## 2019-02-26 RX ORDER — TOPIRAMATE 100 MG/1
TABLET, FILM COATED ORAL
Qty: 30 TABLET | Refills: 0 | Status: SHIPPED | OUTPATIENT
Start: 2019-02-26 | End: 2019-05-17

## 2019-05-14 PROBLEM — E28.39 SUPPRESSION OF OVARIAN SECRETION: Status: ACTIVE | Noted: 2019-05-14

## 2019-05-17 ENCOUNTER — OFFICE VISIT (OUTPATIENT)
Dept: NEUROLOGY | Facility: CLINIC | Age: 39
End: 2019-05-17
Payer: COMMERCIAL

## 2019-05-17 VITALS
WEIGHT: 238 LBS | SYSTOLIC BLOOD PRESSURE: 102 MMHG | DIASTOLIC BLOOD PRESSURE: 72 MMHG | HEART RATE: 76 BPM | RESPIRATION RATE: 16 BRPM | BODY MASS INDEX: 38 KG/M2

## 2019-05-17 DIAGNOSIS — Z98.890 S/P RESECTION OF MENINGIOMA: ICD-10-CM

## 2019-05-17 DIAGNOSIS — H53.9 VISUAL AURA: Primary | ICD-10-CM

## 2019-05-17 DIAGNOSIS — Z86.018 S/P RESECTION OF MENINGIOMA: ICD-10-CM

## 2019-05-17 PROCEDURE — 99213 OFFICE O/P EST LOW 20 MIN: CPT | Performed by: OTHER

## 2019-05-17 RX ORDER — TOPIRAMATE 50 MG/1
TABLET, FILM COATED ORAL
Qty: 30 TABLET | Refills: 5 | Status: SHIPPED | OUTPATIENT
Start: 2019-05-17 | End: 2019-12-20

## 2019-05-17 NOTE — PROGRESS NOTES
HPI:    Patient ID: Alivia Ramesh is a 34year old female. Visual Disturbance   Pertinent negatives include no headaches. Jenna reports visual symptoms are better.  States was unable to tolerate Topamax 100 mg so she cut down to 50 mg but still feels MAIN OR   • BREAST SENTINEL LYMPH NODE BIOPSY Left 2016    Performed by Ivis Coto MD at [facility] MAIN OR   •   10/2011   • CRANIOTOMY FOR TUMOR Left 2017    Performed by Mahsa Patel DO at [facility] MAIN OR   • D & C     • IMPLANT [facility] Rfl: 1   Hydrocortisone Valerate (WESTCORT) 0.2 % External Cream Apply to AA of face BID x 1 week, PRN flares Disp: 1 Tube Rfl: 1   Multiple Vitamins Oral Tab Take 1 tablet by mouth daily.  Disp:  Rfl:      Allergies:No Known Allergies   PHYSICAL EXAM:   Ph [place]            No orders of the defined types were placed in this encounter.       Meds This Visit:  Requested Prescriptions     Signed Prescriptions Disp Refills   • topiramate 50 MG Oral Tab 30 tablet 5     Sig: TAKE 1 TABLET(50 MG) BY MOUTH EVERY N

## 2019-06-14 PROCEDURE — 88305 TISSUE EXAM BY PATHOLOGIST: CPT | Performed by: OBSTETRICS & GYNECOLOGY

## 2019-11-05 RX ORDER — TOPIRAMATE 100 MG/1
TABLET, FILM COATED ORAL
Qty: 30 TABLET | Refills: 0 | OUTPATIENT
Start: 2019-11-05

## 2019-11-05 NOTE — TELEPHONE ENCOUNTER
Request was for old dosing. Refused at this time.      Medication: Topiramate     Date of last refill: 5/17/19 for #30/5 additional refills  [place]    Last office visit: 5/17/19  Due back to clinic per last office no

## 2019-11-15 ENCOUNTER — TELEPHONE (OUTPATIENT)
Dept: NEUROLOGY | Facility: CLINIC | Age: 39
End: 2019-11-15

## 2019-12-20 ENCOUNTER — OFFICE VISIT (OUTPATIENT)
Dept: NEUROLOGY | Facility: CLINIC | Age: 39
End: 2019-12-20
Payer: COMMERCIAL

## 2019-12-20 VITALS
DIASTOLIC BLOOD PRESSURE: 72 MMHG | WEIGHT: 235 LBS | RESPIRATION RATE: 16 BRPM | BODY MASS INDEX: 38 KG/M2 | SYSTOLIC BLOOD PRESSURE: 120 MMHG | HEART RATE: 70 BPM

## 2019-12-20 DIAGNOSIS — Z86.018 S/P RESECTION OF MENINGIOMA: ICD-10-CM

## 2019-12-20 DIAGNOSIS — Z98.890 S/P RESECTION OF MENINGIOMA: ICD-10-CM

## 2019-12-20 DIAGNOSIS — H53.9 VISUAL AURA: Primary | ICD-10-CM

## 2019-12-20 PROCEDURE — 99213 OFFICE O/P EST LOW 20 MIN: CPT | Performed by: OTHER

## 2019-12-20 RX ORDER — TOPIRAMATE 50 MG/1
TABLET, FILM COATED ORAL
Qty: 30 TABLET | Refills: 5 | Status: SHIPPED | OUTPATIENT
Start: 2019-12-20 | End: 2020-02-05

## 2019-12-20 NOTE — PROGRESS NOTES
HPI:    Patient ID: Jason Dias is a 37year old female. Neurologic Problem   Pertinent negatives include no headaches. Visual Disturbance   Pertinent negatives include no headaches. Jenna presented for follow up.  She reports no further aura symp BREAST RECONSTRUCTION/ IMMEDIATE/EXPANDER Left 2016    Performed by Dariana Murrieta MD at [facility] MAIN OR   • [address] Left 2016    Performed by Guillermo Neely MD at [facility] MAIN OR   •   10/2011   • CRANIOTOMY FOR TUMOR L NIGHT 30 tablet 5   • busPIRone HCl 10 MG Oral Tab Take 1 tablet (10 mg total) by mouth 3 (three) times daily. 90 tablet 6   • Tamoxifen Citrate 20 MG Oral Tab Take 1 tablet (20 mg total) by mouth once daily.  90 tablet 1   • Fluocinonide 0.05 % External Cr ASSESSMENT/PLAN:   (H53.9) Visual aura  (primary encounter diagnosis)    (Z98.890,  Z86.018) S/P resection of meningioma      Continue Topamax at 50 mg night  Surveillance MRI brain w and wo contrast per NSG   RTC in about 6months  See orders and medicat

## 2019-12-26 ENCOUNTER — HOSPITAL ENCOUNTER (OUTPATIENT)
Dept: MRI IMAGING | Facility: HOSPITAL | Age: 39
Discharge: HOME | End: 2019-12-26
Attending: NEUROLOGICAL SURGERY
Payer: COMMERCIAL

## 2019-12-26 DIAGNOSIS — Z98.890 S/P RESECTION OF MENINGIOMA: ICD-10-CM

## 2019-12-26 DIAGNOSIS — Z86.018 S/P RESECTION OF MENINGIOMA: ICD-10-CM

## 2019-12-26 PROCEDURE — A9575 INJ GADOTERATE MEGLUMI 0.1ML: HCPCS | Performed by: NEUROLOGICAL SURGERY

## 2019-12-26 PROCEDURE — 70553 MRI BRAIN STEM W/O & W/DYE: CPT | Performed by: NEUROLOGICAL SURGERY

## 2019-12-30 ENCOUNTER — TELEPHONE (OUTPATIENT)
Dept: SURGERY | Facility: CLINIC | Age: 39
End: 2019-12-30

## 2019-12-30 NOTE — TELEPHONE ENCOUNTER
Patient informed of the message below and was thankful for the information. Patient reminder message placed.

## 2019-12-30 NOTE — TELEPHONE ENCOUNTER
Attempted to call patient without results-no ring tone after number dialed. Left detailed VM on personalized recording with  [employer] cell phone to have patient call back for imaging results.     Notes recorded by Ned Claire MD on 12/30/201

## 2019-12-30 NOTE — PROGRESS NOTES
MRI reviewed. Stable from previous with no evidence of recurrence.   Would plan repeat MRI in 2 years

## 2020-03-10 PROBLEM — E53.8 B12 DEFICIENCY: Status: ACTIVE | Noted: 2020-03-10

## 2020-03-10 PROBLEM — R79.89 LFT ELEVATION: Status: ACTIVE | Noted: 2020-03-10

## 2020-03-10 PROBLEM — E66.9 OBESITY (BMI 30-39.9): Status: ACTIVE | Noted: 2020-03-10

## 2020-03-10 PROBLEM — R60.0 PEDAL EDEMA: Status: ACTIVE | Noted: 2020-03-10

## 2020-03-10 PROBLEM — R73.01 IFG (IMPAIRED FASTING GLUCOSE): Status: ACTIVE | Noted: 2020-03-10

## 2020-03-10 PROBLEM — E55.9 VITAMIN D DEFICIENCY: Status: ACTIVE | Noted: 2020-03-10

## 2020-06-26 ENCOUNTER — OFFICE VISIT (OUTPATIENT)
Dept: NEUROLOGY | Facility: CLINIC | Age: 40
End: 2020-06-26
Payer: COMMERCIAL

## 2020-06-26 VITALS
WEIGHT: 228 LBS | DIASTOLIC BLOOD PRESSURE: 70 MMHG | BODY MASS INDEX: 36 KG/M2 | RESPIRATION RATE: 16 BRPM | HEART RATE: 74 BPM | SYSTOLIC BLOOD PRESSURE: 118 MMHG

## 2020-06-26 DIAGNOSIS — H53.9 VISUAL AURA: Primary | ICD-10-CM

## 2020-06-26 DIAGNOSIS — Z86.018 S/P RESECTION OF MENINGIOMA: ICD-10-CM

## 2020-06-26 DIAGNOSIS — Z98.890 S/P RESECTION OF MENINGIOMA: ICD-10-CM

## 2020-06-26 PROCEDURE — 99213 OFFICE O/P EST LOW 20 MIN: CPT | Performed by: OTHER

## 2020-06-26 RX ORDER — TOPIRAMATE 50 MG/1
TABLET, FILM COATED ORAL
Qty: 90 TABLET | Refills: 3 | Status: SHIPPED | OUTPATIENT
Start: 2020-06-26

## 2020-06-26 RX ORDER — BUSPIRONE HYDROCHLORIDE 10 MG/1
1 TABLET ORAL 3 TIMES DAILY
COMMUNITY
Start: 2020-06-07 | End: 2020-10-25

## 2020-06-26 RX ORDER — VENLAFAXINE HYDROCHLORIDE 37.5 MG/1
3 CAPSULE, EXTENDED RELEASE ORAL DAILY
COMMUNITY
Start: 2020-06-01 | End: 2021-03-10

## 2020-06-26 NOTE — PROGRESS NOTES
HPI:    Patient ID: Cayetano Garcia is a 37year old female. Jenna presented for migraine visual aura follow up. She reports no further aura symptoms. She is on Topamax 50 mg at night. last surveillance MRI brain done in Dec 2019 was stable.  No new compl SUBJECTIVE:  Evita Bey is an 48 year old  P4 woman who presents for annual exam. No LMP recorded.        Has experienced postcoital bleeding last 2 years        -improved with OCP; and worse with NuvaRing        -on exam (Dr. Hennessy) excoriation of vagina noted, and switched to OCP    Currently on OCP for about 2 months; experiencing very light spotting        Past Medical History:   Diagnosis Date     Calculus of kidney 1996    Calcium stone - etiology after w/u was excess calcium intake     Fam hx-cardiovas dis NEC     father with MI at age 58 yo, s/p PTCA     Family history of ovarian cancer - following with Dr. Stuart - ? BSO in late        Fertility testing -    used lifetime total of 11 months of Clomid and 2 months of gonadotropins     Hypertension 2017     Volvulus (H) child    Resolved without surgery - hospitalized for 1 day     Past Surgical History:   Procedure Laterality Date     C  DELIVERY ONLY  94,96,99,02    4 C/S     COLONOSCOPY      Dr. Castro [facility]     COLONOSCOPY N/A 10/12/2018    Procedure: COLONOSCOPY;  COLONOSCOPY [fv]  ;  Surgeon: Alexander Castro MD;  Location:  GI     Current Outpatient Medications   Medication     candesartan (ATACAND) 8 MG TABS     Cholecalciferol (VITAMIN D) 2000 UNITS CAPS     Coenzyme Q10 (COQ10) 50 MG CAPS     GLUCOSAMINE 500 MG OR CAPS     norethindrone-ethinyl estradiol (MICROGESTIN ) 1-20 MG-MCG tablet     Nutritional Supplements (GRAPESEED EXTRACT PO)     OMEGA-3 CAPS   OR     PROBIOTICA OR CHEW     topiramate (TOPAMAX) 50 MG tablet     atovaquone-proguanil (MALARONE) 250-100 MG per tablet     doxycycline (VIBRAMYCIN) 100 MG capsule     MULTI-VITAMIN OR TABS     No current facility-administered medications for this visit.      Allergies   Allergen Reactions     No Known Drug Allergies      Social History     Tobacco Use     Smoking status: Never Smoker     Smokeless tobacco: Never Used   Substance Use Topics      Alcohol use: Yes     Alcohol/week: 0.0 oz     Comment: 0-1 wine cooler a week       Review of Systems  CONSTITUTIONAL:NEGATIVE  GI: NEGATIVE  : see above  PSYCHIATRIC: NEGATIVE    OBJECTIVE:  /80 (BP Location: Left arm, Patient Position: Chair, Cuff Size: Adult Regular)   Pulse 80   Wt 59 kg (130 lb)   BMI 22.31 kg/m     EXAM:  GENERAL APPEARANCE: healthy, alert and no distress  BREAST: normal without masses, tenderness or nipple discharge and no palpable axillary masses or adenopathy  ABDOMEN: soft, nontender, without hepatosplenomegaly or masses    Pelvic Exam:  Vulva: No external lesions, normal hair distribution, no adenopathy  Vagina: Moist, pink, no abnormal discharge, well rugated, no lesions  Cervix: Pap smear is taken, nulliparous, smooth, pink, no visible lesions  Uterus: Normal size, anteverted, non-tender, mobile  Ovaries: No mass, non-tender, mobile      ASSESSMENT:  Satisfactory annual gyn exam  Postcoital bleeding; improved on OCP      -recommend pap smear-done      -recommend repeat ultrasound-ordered (both postcoital spotting and family history of ovarian cancer)    Change OCP to continuous use (3 months) to assist with menstrual migraine    PLAN:  (N93.0) Postcoital bleeding  (primary encounter diagnosis)  Plan: US Pelvic Complete w Transvaginal, Pap imaged         thin layer screen with HPV - recommended age 30        - 65 years (select HPV order below), HPV High         Risk Types DNA Cervical          (Z30.011) Encounter for initial prescription of contraceptive pills        PE: reviewed health maintenance including diet, regular exercise and periodic exams.  Health Maintenance   Topic Date Due     HIV SCREEN (SYSTEM ASSIGNED)  03/21/1986     PHQ-2 Q1 YR  01/26/2018     ZOSTER IMMUNIZATION (1 of 2) 03/21/2018     INFLUENZA VACCINE (1) 09/01/2018     BMP Q1 YR  03/01/2019     MICROALBUMIN Q1 YEAR  03/01/2019     MAMMO SCREEN Q2 YR (SYSTEM ASSIGNED)  12/05/2019     PAP Q3 YR  01/06/2020  NODE BIOPSY Left 2016    Performed by Swetha Wilkins MD at [facility] MAIN OR   •   10/2011   • CRANIOTOMY FOR TUMOR Left 2017    Performed by Angy Naik DO at [address] [facility]   • D & C     • IMPLANT RIGHT  2017   • INSERTION PORT-A-CA     HPV Q3 Years  01/06/2020     DTAP/TDAP/TD IMMUNIZATION (2 - Td) 10/08/2020     LIPID SCREEN Q5 YR FEMALE (SYSTEM ASSIGNED)  03/01/2023     COLON CANCER SCREEN (SYSTEM ASSIGNED)  10/12/2028     IPV IMMUNIZATION  Aged Out     MENINGITIS IMMUNIZATION  Aged Out          TAKE 1 TABLET(50 MG) BY MOUTH EVERY NIGHT 90 tablet 3   • Tamoxifen Citrate 20 MG Oral Tab Take 1 tablet (20 mg total) by mouth once daily. 90 tablet 1   • Melatonin 10 MG Oral Cap Take by mouth nightly.        Allergies:No Known Allergies   PHYSICAL EXAM: Stanley          Physical Exam

## 2020-07-09 PROCEDURE — 88342 IMHCHEM/IMCYTCHM 1ST ANTB: CPT | Performed by: RADIOLOGY

## 2020-07-09 PROCEDURE — 88305 TISSUE EXAM BY PATHOLOGIST: CPT | Performed by: RADIOLOGY

## 2021-04-28 ENCOUNTER — OFFICE VISIT (OUTPATIENT)
Dept: NEUROLOGY | Facility: CLINIC | Age: 41
End: 2021-04-28
Payer: COMMERCIAL

## 2021-04-28 VITALS
SYSTOLIC BLOOD PRESSURE: 126 MMHG | DIASTOLIC BLOOD PRESSURE: 70 MMHG | WEIGHT: 235.81 LBS | HEART RATE: 72 BPM | RESPIRATION RATE: 16 BRPM | BODY MASS INDEX: 37 KG/M2

## 2021-04-28 DIAGNOSIS — R41.840 DIFFICULTY CONCENTRATING: ICD-10-CM

## 2021-04-28 DIAGNOSIS — H53.9 VISUAL AURA: Primary | ICD-10-CM

## 2021-04-28 PROCEDURE — 3074F SYST BP LT 130 MM HG: CPT | Performed by: OTHER

## 2021-04-28 PROCEDURE — 3078F DIAST BP <80 MM HG: CPT | Performed by: OTHER

## 2021-04-28 PROCEDURE — 99213 OFFICE O/P EST LOW 20 MIN: CPT | Performed by: OTHER

## 2021-04-28 NOTE — PROGRESS NOTES
Patient states increase in fatigue, also increase in mental fogginess. Patient states almost a \"doppy\" feeling. Patient states decrease in retaining information.

## 2021-04-28 NOTE — PROGRESS NOTES
HPI:    Patient ID: Jose Ra is a 41year old female. HPI  Patient is a 53-year-old female who presented for follow-up for visual aura.   She has history of left occipital meningioma status post resection in Jan 2017 with residual superior arcuate fi Smoking status: Never Smoker      Smokeless tobacco: Never Used    Vaping Use      Vaping Use: Never used    Alcohol use: No    Drug use: No       Review of Systems   Constitutional: Negative. HENT: Negative. Eyes: Negative for visual disturbance. ophthalmology reports. Rest of cranial nerves intact  Sensory : Intact to all modalities including light touch, pinprick, vibration and proprioception  Motor: Normal tone and bulk in all extremities.  Strength is 5/5 in all muscle groups  Reflexes: symmetri

## 2021-06-09 ENCOUNTER — TELEPHONE (OUTPATIENT)
Dept: NEUROLOGY | Facility: CLINIC | Age: 41
End: 2021-06-09

## 2021-06-09 NOTE — TELEPHONE ENCOUNTER
pt came into office and wanted to let the Dr know that she is doing really well on the new dosage of Topiramate

## 2021-10-01 PROBLEM — R21 RASH OF FACE: Status: RESOLVED | Noted: 2017-01-06 | Resolved: 2021-10-01

## 2021-11-02 ENCOUNTER — TELEPHONE (OUTPATIENT)
Dept: SURGERY | Facility: CLINIC | Age: 41
End: 2021-11-02

## 2021-11-02 DIAGNOSIS — Z98.890 S/P CRANIOTOMY: ICD-10-CM

## 2021-11-02 DIAGNOSIS — D32.9 MENINGIOMA (HCC): Primary | ICD-10-CM

## 2021-11-02 NOTE — TELEPHONE ENCOUNTER
MRI order placed.  Patient will need to re establish care as she is a prior patient of Dr. Robi Sinha

## 2021-11-02 NOTE — TELEPHONE ENCOUNTER
Per last OV note  \"Patient of  due for 2 year follow up MRI brain (w&w/out) due 12/2021. Order will need to be placed and patient will need to be called and reminded.  \"      Order pended to providers

## 2021-12-01 ENCOUNTER — HOSPITAL ENCOUNTER (OUTPATIENT)
Dept: MRI IMAGING | Facility: HOSPITAL | Age: 41
Discharge: HOME | End: 2021-12-01
Attending: PHYSICIAN ASSISTANT
Payer: COMMERCIAL

## 2021-12-01 DIAGNOSIS — D32.9 MENINGIOMA (HCC): ICD-10-CM

## 2021-12-01 DIAGNOSIS — Z98.890 S/P CRANIOTOMY: ICD-10-CM

## 2021-12-01 PROCEDURE — A9575 INJ GADOTERATE MEGLUMI 0.1ML: HCPCS | Performed by: PHYSICIAN ASSISTANT

## 2021-12-01 PROCEDURE — 70553 MRI BRAIN STEM W/O & W/DYE: CPT | Performed by: PHYSICIAN ASSISTANT

## 2025-01-02 NOTE — TELEPHONE ENCOUNTER
Patient was seen in the office today 1/23/17. Also see TE 1/23/17 for more information. Rec'd supine in bed in NAD, +IVL, A&O x4, [place].

## (undated) DEVICE — ELECTRODE LOOP GREEN 15X12

## (undated) DEVICE — PROXIMATE RH ROTATING HEAD SKIN STAPLERS (35 WIDE) CONTAINS 35 STAINLESS STEEL STAPLES: Brand: PROXIMATE

## (undated) DEVICE — CRANIOTOMY CDS: Brand: MEDLINE INDUSTRIES, INC.

## (undated) DEVICE — Device

## (undated) DEVICE — CODMAN® SURGICAL PATTIES 1" X 3" (2.54CM X 7.62CM): Brand: CODMAN®

## (undated) DEVICE — 3M(TM) TEGADERM(TM) TRANSPARENT FILM DRESSING FRAME STYLE 9505W: Brand: 3M™ TEGADERM™

## (undated) DEVICE — STERILE POLYISOPRENE POWDER-FREE SURGICAL GLOVES: Brand: PROTEXIS

## (undated) DEVICE — ELECTRODE LOOP YELLOW 10X10

## (undated) DEVICE — DRAPE MICROSCOPE NEURO PENTERO

## (undated) DEVICE — KENDALL SCD EXPRESS SLEEVES, THIGH LENGTH, MEDIUM: Brand: KENDALL SCD

## (undated) DEVICE — PAD SACRAL SPAN AID

## (undated) DEVICE — 6.0MM PRECISION ROUND

## (undated) DEVICE — MARKER SKIN 2 TIP

## (undated) DEVICE — DRILL SRG OIL CRTDG MAESTRO

## (undated) DEVICE — RETRACTOR LONE STAR STAYS LG

## (undated) DEVICE — SUTURE VICRYL 2-0 CT-2

## (undated) DEVICE — TRANSPOSAL ULTRAFLEX DUO/QUAD ULTRA CART MANIFOLD

## (undated) DEVICE — SHEET,DRAPE,70X100,STERILE: Brand: MEDLINE

## (undated) DEVICE — SOLUTION ANSEP 70% ISOPRPNL

## (undated) DEVICE — BANDAGE ROLL,100% COTTON, 6 PLY, LARGE: Brand: KERLIX

## (undated) DEVICE — CUSA® EXCEL 36 KHZ CEM™ NOSECONE: Brand: CUSA® EXCEL

## (undated) DEVICE — 3M™ COBAN™ NL STERILE NON-LATEX SELF-ADHERENT WRAP, 2084S, 4 IN X 5 YD (10 CM X 4,5 M), 18 ROLLS/CASE: Brand: 3M™ COBAN™

## (undated) DEVICE — D-58 TAPERED ROUTER

## (undated) DEVICE — NON-ADHERENT PAD PREPACK: Brand: TELFA

## (undated) DEVICE — PREP SCRUB EZ HIBICLENS

## (undated) DEVICE — CUSA® EXCEL 36 KHZ DISPOSABLE WRENCH: Brand: CUSA® EXCEL

## (undated) DEVICE — SUTURE NUROLON 4-0 TF

## (undated) DEVICE — CUSA® EXCEL 36KHZ CUSA® MANIFOLD TUBING SET: Brand: CUSA® EXCEL

## (undated) DEVICE — SUTURE VICRYL 0 CT-1

## (undated) DEVICE — SOL  .9 1000ML BTL

## (undated) DEVICE — MARKER PASSIVE NAVIGATION

## (undated) DEVICE — CAUTERY BLADE 2IN INS E1455

## (undated) NOTE — MR AVS SNAPSHOT
[address] [facility] [address], [address] [facility] [id number]               Thank you for choosing us for your health care visit with Fidel Gong RN.   We are glad to serve you and happy to provide you with this summar recent med list.                acetaminophen 500 MG Tabs   Take 500 mg by mouth every 6 (six) hours as needed for Pain.  2 tabs   Commonly known as:  TYLENOL EXTRA STRENGTH           Benadryl/Maalox/Lidocaine 1:1:1 solution   Take 5-10 mL by mouth TID AC&H discharge instructions in [employer] by going to Visits < Admission Summaries. If you've been to the Emergency Department or your doctor's office, you can view your past visit information in [employer] by going to Visits < Visit Summaries. Dymant questions?

## (undated) NOTE — MR AVS SNAPSHOT
[facility], [address], [address] [place], [place] [address]               Thank you for choosing us for your health care visit with RORO Darden.   We are glad to serve you and happy to provide you with this ? Patient must present photo ID at time of . If a designated family member will be picking up prescription, office must be given name of individual in advance and they must present an ID as well. ?  The name of the person picking up your prescripti Occupational Therapy Visit Lymphedema with Helen Walker, Nataliya1 Ambassador Abdi Vigil Occupational Therapy [facility])    [address]   [phone]            Feb 24, 2017  2:00 PM   Occupational Therapy Danyel Nicole Use as directed apply thin layer over Mediport site 1 hour prior to appointment cover with plastic. Commonly known as:  EMLA           omeprazole 20 MG Cpdr   Take 1 capsule (20 mg total) by mouth every morning.    Commonly known as:  PRILOSEC           O discharge instructions in [employer] by going to Visits < Admission Summaries. If you've been to the Emergency Department or your doctor's office, you can view your past visit information in [employer] by going to Visits < Visit Summaries. Huixiaoer questions?

## (undated) NOTE — LETTER
[facility] [place] [facility] Department  Phone: [phone]  Right Fax: [phone]  [facility] [address] Date: 17    Patient Name: Ze Dayan  Surgery Date: 2017    CSN: [id number]  Medical Record: [id number]   : 10/2

## (undated) NOTE — IP AVS SNAPSHOT
[facility] [place] [address] [place], [address] ~ [phone]                Discharge Summary   1/31/2017    Mona Blount           Admission Information        Provider Department    1/31/2017 Jamel Kim.  DO Harrison Lee 7ne-A beginning 2/4. Take 4 mg, 1 tab, PO TID x 1 day; then take 4 mg, 1 tab, PO BID x 2 days; then take 4 mg, 1 tab, PO daily x 2 days; then stop.     Trina Orourke                                 hydrocortisone 2.5 % Crea   Commonly known as:  PROCTOSOL HC Ondansetron HCl 8 MG tablet   Commonly known as:  ZOFRAN        Take 1 tablet (8 mg total) by mouth every 8 (eight) hours as needed for Nausea.     Sari Ahumada                           Prochlorperazine Maleate 10 mg tablet   Last time this was given: [address] [facility] [address]   [phone]            Feb 09, 2017 11:30 AM   Occupational Therapy Visit Lymphedema with Regina Herrera OT   [facility] Occupational Therapy [facility])    [address] [address]   04 Other Pre-op Orders:      PAT Orders:  PT / INR    Urinalysis    Type and Cross    Blood detail:      Other PAT tests:  T&CM 2 UNITS PRBCS, PTT,  USE CBC AND CMP FROM 1/20/2017.  CT SCAN MORNING OF SURGERY    Patient Preferred Phone Number:  [phone] Recent Hematology Lab Results (cont.)  (Last 3 results in the past 90 days)    Neutrophil % Lymphocyte % Monocyte % Eosinophil % Basophil % Prelim Neut Abs Final Neut Abs Lymphocyte Abso Monocyte Absolu Eosinophil Abso Basophil Absolu    (02/02/17)  85.4 ( can help with your Affordable Care Act coverage, as well as all types of Medicaid plans. To get signed up and covered, please call [phone] and ask to get set up for an insurance coverage that is in-network with Carlos Reddy What to report to your healthcare team: Stomach upset, unresolved pain           GI Medications     omeprazole 20 MG Oral Capsule Delayed Release    Benadryl/Maalox/Lidocaine 1:1:1 solution    Ondansetron HCl 8 MG Oral Tab       Use:  Nausea/vomiting, acid Most common side effects: Dizziness, drowsiness, problems with movement   What to report to your healthcare team: Changes in thinking, talking or movement, drowsiness, shaking           Calming and Sleep Medications     LORazepam 0.5 MG Oral Tab       Use:

## (undated) NOTE — LETTER
Patient Name: Uriah March  YOB: 1980          MRN number:  [id number]  Date:  4/6/2017  Referring Physician:  Alejandro Fregoso MD    Progress Summary  Pt has attended 14/16, cancelled 2, and no shown 0 visits in Occupational Therapy.      Christoph [place] discuss need to exchange with garment specialist if she continues to have difficulty with fingers. EDEMA/TISSUE QUALITY:  Left UE circumferential lymphedema volume now 6.6cm larger than dominant Right vs 1.1cm smaller as seen at last progress summary. Electronically signed by therapist: Swetha Burnham.  Dg, OTR/L, IRINA

## (undated) NOTE — Clinical Note
Occipital seizures vs visual aura. Will get an EEG and start Topamax. Please see note for full evaluation. Thank you.

## (undated) NOTE — LETTER
Patient Name: Mona Blount  YOB: 1980          MRN number:  [id number]  Date:  3/6/2017  Referring Physician:  Alaine Goltz, MD    Progress Summary  Pt has attended 9/16, cancelled 0, and no shown 0 visits in Occupational Therapy.      Subjec self-manual lymph drainage; use of modified compression over Left LE via Tensogrip sleeve or Tensogrip sleeve over Rosidal wrap.  Although pt's Left UE volume has not reduced to goal levels, she has had enough volume loss to allow her to be measured for an Patient/Family/Caregiver was advised of these findings, precautions, and treatment options and has agreed to actively participate in planning and for this course of care.     Thank you for your referral. If you have any questions, please contact me at Dept:

## (undated) NOTE — MR AVS SNAPSHOT
[facility], [address] [address]               Thank you for choosing us for your health care visit with Carlos Rosado. Jules Angel.   We are glad to serve you and happy to provide you with this ? Patient must present photo ID at time of . If a designated family member will be picking up prescription, office must be given name of individual in advance and they must present an ID as well. ?  The name of the person picking up your prescripti EXAM-ESTABLISHED with Karolyn Perez MD   [facility] OPHTHALMOLOGY [facility] AT [address])    [address] [facility] [address] [phone]              Allergies as of Jan 18, 2017     No Known Allergies                Today's Vital Signs     BP Pulse Heigh Apply to rash twice daily for no longer than 7 days.    What changed:  additional instructions                   Today's Orders     MRI BRAIN (W+WO) (CPT=70553) [[id number]]    Complete by:  Jan 18, 2017 (Approximate)    Assoc Dx:  Vision disturbance [H53.9],

## (undated) NOTE — MR AVS SNAPSHOT
[facility], [address] [id number]               Thank you for choosing us for your health care visit with Carlene Gan.   We are glad to serve you and happy to provide you with this [place], left        Brain mass        Idiopathic intracranial hypertension          Instructions and Information about Your Health      Refill policies:    ? Allow 2 business days for refills; controlled substances may take longer. ?  Contact your pharma without insurance authorization, patient may be responsible for the entire amount billed. Precertification and Prior Authorizations  If your physician has recommended that you have a procedure or additional testing performed.   [address] Visit [facility] online at  [email or web address]